# Patient Record
Sex: MALE | Race: WHITE | NOT HISPANIC OR LATINO | Employment: OTHER | URBAN - NONMETROPOLITAN AREA
[De-identification: names, ages, dates, MRNs, and addresses within clinical notes are randomized per-mention and may not be internally consistent; named-entity substitution may affect disease eponyms.]

---

## 2024-06-08 ENCOUNTER — HOSPITAL ENCOUNTER (INPATIENT)
Facility: HOSPITAL | Age: 59
LOS: 2 days | Discharge: HOME | End: 2024-06-11
Attending: EMERGENCY MEDICINE | Admitting: STUDENT IN AN ORGANIZED HEALTH CARE EDUCATION/TRAINING PROGRAM

## 2024-06-08 DIAGNOSIS — N28.9 KIDNEY LESION, NATIVE, RIGHT: ICD-10-CM

## 2024-06-08 DIAGNOSIS — E11.65 UNCONTROLLED TYPE 2 DIABETES MELLITUS WITH HYPERGLYCEMIA (MULTI): ICD-10-CM

## 2024-06-08 DIAGNOSIS — R79.89 LACTATE BLOOD INCREASE: ICD-10-CM

## 2024-06-08 DIAGNOSIS — N39.0 UTI (URINARY TRACT INFECTION): Primary | ICD-10-CM

## 2024-06-08 DIAGNOSIS — N41.0 ACUTE PROSTATITIS: ICD-10-CM

## 2024-06-08 LAB
ANION GAP SERPL CALC-SCNC: 14 MMOL/L (ref 10–20)
APPEARANCE UR: ABNORMAL
BACTERIA #/AREA URNS AUTO: ABNORMAL /HPF
BASOPHILS # BLD AUTO: 0.04 X10*3/UL (ref 0–0.1)
BASOPHILS NFR BLD AUTO: 0.3 %
BILIRUB UR STRIP.AUTO-MCNC: NEGATIVE MG/DL
BUN SERPL-MCNC: 12 MG/DL (ref 6–23)
CALCIUM SERPL-MCNC: 9.2 MG/DL (ref 8.6–10.3)
CHLORIDE SERPL-SCNC: 99 MMOL/L (ref 98–107)
CO2 SERPL-SCNC: 24 MMOL/L (ref 21–32)
COLOR UR: YELLOW
CREAT SERPL-MCNC: 0.95 MG/DL (ref 0.5–1.3)
EGFRCR SERPLBLD CKD-EPI 2021: >90 ML/MIN/1.73M*2
EOSINOPHIL # BLD AUTO: 0.02 X10*3/UL (ref 0–0.7)
EOSINOPHIL NFR BLD AUTO: 0.2 %
ERYTHROCYTE [DISTWIDTH] IN BLOOD BY AUTOMATED COUNT: 12.5 % (ref 11.5–14.5)
GLUCOSE SERPL-MCNC: 220 MG/DL (ref 74–99)
GLUCOSE UR STRIP.AUTO-MCNC: ABNORMAL MG/DL
HCT VFR BLD AUTO: 43 % (ref 41–52)
HGB BLD-MCNC: 14.7 G/DL (ref 13.5–17.5)
IMM GRANULOCYTES # BLD AUTO: 0.06 X10*3/UL (ref 0–0.7)
IMM GRANULOCYTES NFR BLD AUTO: 0.5 % (ref 0–0.9)
KETONES UR STRIP.AUTO-MCNC: ABNORMAL MG/DL
LACTATE SERPL-SCNC: 2.9 MMOL/L (ref 0.4–2)
LEUKOCYTE ESTERASE UR QL STRIP.AUTO: ABNORMAL
LYMPHOCYTES # BLD AUTO: 0.76 X10*3/UL (ref 1.2–4.8)
LYMPHOCYTES NFR BLD AUTO: 6 %
MCH RBC QN AUTO: 32.1 PG (ref 26–34)
MCHC RBC AUTO-ENTMCNC: 34.2 G/DL (ref 32–36)
MCV RBC AUTO: 94 FL (ref 80–100)
MONOCYTES # BLD AUTO: 0.55 X10*3/UL (ref 0.1–1)
MONOCYTES NFR BLD AUTO: 4.3 %
NEUTROPHILS # BLD AUTO: 11.22 X10*3/UL (ref 1.2–7.7)
NEUTROPHILS NFR BLD AUTO: 88.7 %
NITRITE UR QL STRIP.AUTO: NEGATIVE
NRBC BLD-RTO: 0 /100 WBCS (ref 0–0)
PH UR STRIP.AUTO: 5 [PH]
PLATELET # BLD AUTO: 118 X10*3/UL (ref 150–450)
POTASSIUM SERPL-SCNC: 3.8 MMOL/L (ref 3.5–5.3)
PROT UR STRIP.AUTO-MCNC: ABNORMAL MG/DL
RBC # BLD AUTO: 4.58 X10*6/UL (ref 4.5–5.9)
RBC # UR STRIP.AUTO: ABNORMAL /UL
RBC #/AREA URNS AUTO: >20 /HPF
RBC MORPH BLD: NORMAL
SODIUM SERPL-SCNC: 133 MMOL/L (ref 136–145)
SP GR UR STRIP.AUTO: 1.02
STOMATOCYTES BLD QL SMEAR: NORMAL
UROBILINOGEN UR STRIP.AUTO-MCNC: <2 MG/DL
WBC # BLD AUTO: 12.7 X10*3/UL (ref 4.4–11.3)
WBC #/AREA URNS AUTO: >50 /HPF

## 2024-06-08 PROCEDURE — 85025 COMPLETE CBC W/AUTO DIFF WBC: CPT | Performed by: EMERGENCY MEDICINE

## 2024-06-08 PROCEDURE — 87491 CHLMYD TRACH DNA AMP PROBE: CPT | Mod: CONLAB | Performed by: EMERGENCY MEDICINE

## 2024-06-08 PROCEDURE — 51798 US URINE CAPACITY MEASURE: CPT

## 2024-06-08 PROCEDURE — 83605 ASSAY OF LACTIC ACID: CPT | Performed by: EMERGENCY MEDICINE

## 2024-06-08 PROCEDURE — 87040 BLOOD CULTURE FOR BACTERIA: CPT | Mod: CONLAB | Performed by: EMERGENCY MEDICINE

## 2024-06-08 PROCEDURE — 87205 SMEAR GRAM STAIN: CPT | Mod: CONLAB | Performed by: EMERGENCY MEDICINE

## 2024-06-08 PROCEDURE — 81001 URINALYSIS AUTO W/SCOPE: CPT | Performed by: EMERGENCY MEDICINE

## 2024-06-08 PROCEDURE — 2500000004 HC RX 250 GENERAL PHARMACY W/ HCPCS (ALT 636 FOR OP/ED): Performed by: EMERGENCY MEDICINE

## 2024-06-08 PROCEDURE — 80048 BASIC METABOLIC PNL TOTAL CA: CPT | Performed by: EMERGENCY MEDICINE

## 2024-06-08 PROCEDURE — 36415 COLL VENOUS BLD VENIPUNCTURE: CPT | Performed by: EMERGENCY MEDICINE

## 2024-06-08 PROCEDURE — 99285 EMERGENCY DEPT VISIT HI MDM: CPT

## 2024-06-08 PROCEDURE — 2500000001 HC RX 250 WO HCPCS SELF ADMINISTERED DRUGS (ALT 637 FOR MEDICARE OP): Performed by: EMERGENCY MEDICINE

## 2024-06-08 RX ORDER — PHENAZOPYRIDINE HYDROCHLORIDE 100 MG/1
95 TABLET, FILM COATED ORAL ONCE
Status: COMPLETED | OUTPATIENT
Start: 2024-06-08 | End: 2024-06-08

## 2024-06-08 RX ORDER — GLYBURIDE 5 MG/1
5 TABLET ORAL 2 TIMES DAILY PRN
COMMUNITY
Start: 2024-04-11

## 2024-06-08 RX ORDER — CEFTRIAXONE 1 G/50ML
1 INJECTION, SOLUTION INTRAVENOUS ONCE
Status: COMPLETED | OUTPATIENT
Start: 2024-06-08 | End: 2024-06-08

## 2024-06-08 RX ORDER — METOPROLOL SUCCINATE 50 MG/1
50 TABLET, EXTENDED RELEASE ORAL DAILY
COMMUNITY
Start: 2024-04-11 | End: 2024-06-15 | Stop reason: SDUPTHER

## 2024-06-08 RX ORDER — LISINOPRIL 2.5 MG/1
2.5 TABLET ORAL DAILY
COMMUNITY
Start: 2024-04-11 | End: 2024-06-15 | Stop reason: SDUPTHER

## 2024-06-08 RX ORDER — ACETAMINOPHEN 325 MG/1
975 TABLET ORAL ONCE
Status: COMPLETED | OUTPATIENT
Start: 2024-06-08 | End: 2024-06-08

## 2024-06-08 RX ORDER — KETOROLAC TROMETHAMINE 15 MG/ML
15 INJECTION, SOLUTION INTRAMUSCULAR; INTRAVENOUS ONCE
Status: COMPLETED | OUTPATIENT
Start: 2024-06-08 | End: 2024-06-08

## 2024-06-08 RX ADMIN — ACETAMINOPHEN 975 MG: 325 TABLET ORAL at 22:43

## 2024-06-08 RX ADMIN — KETOROLAC TROMETHAMINE 15 MG: 15 INJECTION, SOLUTION INTRAMUSCULAR; INTRAVENOUS at 22:44

## 2024-06-08 RX ADMIN — SODIUM CHLORIDE 1000 ML: 9 INJECTION, SOLUTION INTRAVENOUS at 23:56

## 2024-06-08 RX ADMIN — CEFTRIAXONE 1 G: 1 INJECTION, SOLUTION INTRAVENOUS at 23:12

## 2024-06-08 RX ADMIN — PHENAZOPYRIDINE 100 MG: 100 TABLET ORAL at 22:43

## 2024-06-08 RX ADMIN — SODIUM CHLORIDE 500 ML: 9 INJECTION, SOLUTION INTRAVENOUS at 22:43

## 2024-06-08 ASSESSMENT — PAIN DESCRIPTION - PAIN TYPE: TYPE: ACUTE PAIN

## 2024-06-08 ASSESSMENT — COLUMBIA-SUICIDE SEVERITY RATING SCALE - C-SSRS
2. HAVE YOU ACTUALLY HAD ANY THOUGHTS OF KILLING YOURSELF?: NO
6. HAVE YOU EVER DONE ANYTHING, STARTED TO DO ANYTHING, OR PREPARED TO DO ANYTHING TO END YOUR LIFE?: NO
1. IN THE PAST MONTH, HAVE YOU WISHED YOU WERE DEAD OR WISHED YOU COULD GO TO SLEEP AND NOT WAKE UP?: NO

## 2024-06-08 ASSESSMENT — PAIN SCALES - GENERAL: PAINLEVEL_OUTOF10: 8

## 2024-06-08 ASSESSMENT — PAIN DESCRIPTION - LOCATION: LOCATION: GROIN

## 2024-06-08 ASSESSMENT — PAIN - FUNCTIONAL ASSESSMENT: PAIN_FUNCTIONAL_ASSESSMENT: 0-10

## 2024-06-09 ENCOUNTER — APPOINTMENT (OUTPATIENT)
Dept: CARDIOLOGY | Facility: HOSPITAL | Age: 59
End: 2024-06-09

## 2024-06-09 ENCOUNTER — APPOINTMENT (OUTPATIENT)
Dept: RADIOLOGY | Facility: HOSPITAL | Age: 59
End: 2024-06-09

## 2024-06-09 PROBLEM — N41.0 ACUTE PROSTATITIS: Status: ACTIVE | Noted: 2024-06-09

## 2024-06-09 PROBLEM — N28.9 KIDNEY LESION, NATIVE, RIGHT: Status: ACTIVE | Noted: 2024-06-09

## 2024-06-09 PROBLEM — R79.89 LACTATE BLOOD INCREASE: Status: ACTIVE | Noted: 2024-06-09

## 2024-06-09 PROBLEM — N39.0 UTI (URINARY TRACT INFECTION): Status: ACTIVE | Noted: 2024-06-09

## 2024-06-09 LAB
ANION GAP SERPL CALC-SCNC: 13 MMOL/L (ref 10–20)
BUN SERPL-MCNC: 12 MG/DL (ref 6–23)
CALCIUM SERPL-MCNC: 8.3 MG/DL (ref 8.6–10.3)
CHLORIDE SERPL-SCNC: 101 MMOL/L (ref 98–107)
CO2 SERPL-SCNC: 24 MMOL/L (ref 21–32)
CREAT SERPL-MCNC: 0.99 MG/DL (ref 0.5–1.3)
EGFRCR SERPLBLD CKD-EPI 2021: 88 ML/MIN/1.73M*2
ERYTHROCYTE [DISTWIDTH] IN BLOOD BY AUTOMATED COUNT: 13 % (ref 11.5–14.5)
EST. AVERAGE GLUCOSE BLD GHB EST-MCNC: 203 MG/DL
GLUCOSE BLD MANUAL STRIP-MCNC: 222 MG/DL (ref 74–99)
GLUCOSE BLD MANUAL STRIP-MCNC: 228 MG/DL (ref 74–99)
GLUCOSE SERPL-MCNC: 228 MG/DL (ref 74–99)
HBA1C MFR BLD: 8.7 %
HCT VFR BLD AUTO: 40.1 % (ref 41–52)
HGB BLD-MCNC: 13.2 G/DL (ref 13.5–17.5)
HOLD SPECIMEN: NORMAL
LACTATE SERPL-SCNC: 2.8 MMOL/L (ref 0.4–2)
LACTATE SERPL-SCNC: 3.1 MMOL/L (ref 0.4–2)
LACTATE SERPL-SCNC: 3.4 MMOL/L (ref 0.4–2)
MCH RBC QN AUTO: 31.5 PG (ref 26–34)
MCHC RBC AUTO-ENTMCNC: 32.9 G/DL (ref 32–36)
MCV RBC AUTO: 96 FL (ref 80–100)
NRBC BLD-RTO: 0 /100 WBCS (ref 0–0)
PLATELET # BLD AUTO: 114 X10*3/UL (ref 150–450)
POTASSIUM SERPL-SCNC: 4.1 MMOL/L (ref 3.5–5.3)
RBC # BLD AUTO: 4.19 X10*6/UL (ref 4.5–5.9)
SODIUM SERPL-SCNC: 134 MMOL/L (ref 136–145)
WBC # BLD AUTO: 15.5 X10*3/UL (ref 4.4–11.3)

## 2024-06-09 PROCEDURE — 36415 COLL VENOUS BLD VENIPUNCTURE: CPT | Performed by: EMERGENCY MEDICINE

## 2024-06-09 PROCEDURE — 83605 ASSAY OF LACTIC ACID: CPT | Mod: IPSPLIT | Performed by: EMERGENCY MEDICINE

## 2024-06-09 PROCEDURE — 83605 ASSAY OF LACTIC ACID: CPT | Performed by: EMERGENCY MEDICINE

## 2024-06-09 PROCEDURE — 2500000004 HC RX 250 GENERAL PHARMACY W/ HCPCS (ALT 636 FOR OP/ED): Performed by: EMERGENCY MEDICINE

## 2024-06-09 PROCEDURE — 74177 CT ABD & PELVIS W/CONTRAST: CPT | Performed by: RADIOLOGY

## 2024-06-09 PROCEDURE — 2500000001 HC RX 250 WO HCPCS SELF ADMINISTERED DRUGS (ALT 637 FOR MEDICARE OP): Mod: IPSPLIT | Performed by: INTERNAL MEDICINE

## 2024-06-09 PROCEDURE — 85027 COMPLETE CBC AUTOMATED: CPT | Mod: IPSPLIT | Performed by: NURSE PRACTITIONER

## 2024-06-09 PROCEDURE — 2500000004 HC RX 250 GENERAL PHARMACY W/ HCPCS (ALT 636 FOR OP/ED): Mod: IPSPLIT

## 2024-06-09 PROCEDURE — 74177 CT ABD & PELVIS W/CONTRAST: CPT

## 2024-06-09 PROCEDURE — 2500000001 HC RX 250 WO HCPCS SELF ADMINISTERED DRUGS (ALT 637 FOR MEDICARE OP): Mod: IPSPLIT | Performed by: NURSE PRACTITIONER

## 2024-06-09 PROCEDURE — 94760 N-INVAS EAR/PLS OXIMETRY 1: CPT | Mod: IPSPLIT

## 2024-06-09 PROCEDURE — 93010 ELECTROCARDIOGRAM REPORT: CPT | Performed by: INTERNAL MEDICINE

## 2024-06-09 PROCEDURE — 93005 ELECTROCARDIOGRAM TRACING: CPT | Mod: IPSPLIT

## 2024-06-09 PROCEDURE — 82947 ASSAY GLUCOSE BLOOD QUANT: CPT | Mod: IPSPLIT

## 2024-06-09 PROCEDURE — 2500000004 HC RX 250 GENERAL PHARMACY W/ HCPCS (ALT 636 FOR OP/ED): Mod: IPSPLIT | Performed by: NURSE PRACTITIONER

## 2024-06-09 PROCEDURE — 1210000001 HC SEMI-PRIVATE ROOM DAILY: Mod: IPSPLIT

## 2024-06-09 PROCEDURE — 80048 BASIC METABOLIC PNL TOTAL CA: CPT | Mod: IPSPLIT | Performed by: NURSE PRACTITIONER

## 2024-06-09 PROCEDURE — 83036 HEMOGLOBIN GLYCOSYLATED A1C: CPT | Mod: CONLAB | Performed by: NURSE PRACTITIONER

## 2024-06-09 PROCEDURE — 2550000001 HC RX 255 CONTRASTS: Performed by: EMERGENCY MEDICINE

## 2024-06-09 RX ORDER — ONDANSETRON 4 MG/1
4 TABLET, FILM COATED ORAL EVERY 8 HOURS PRN
Status: DISCONTINUED | OUTPATIENT
Start: 2024-06-09 | End: 2024-06-11 | Stop reason: HOSPADM

## 2024-06-09 RX ORDER — ACETAMINOPHEN 160 MG/5ML
650 SOLUTION ORAL EVERY 4 HOURS PRN
Status: DISCONTINUED | OUTPATIENT
Start: 2024-06-09 | End: 2024-06-10

## 2024-06-09 RX ORDER — FAMOTIDINE 20 MG/1
20 TABLET, FILM COATED ORAL 2 TIMES DAILY
Status: DISCONTINUED | OUTPATIENT
Start: 2024-06-09 | End: 2024-06-11 | Stop reason: HOSPADM

## 2024-06-09 RX ORDER — ENOXAPARIN SODIUM 100 MG/ML
40 INJECTION SUBCUTANEOUS EVERY 24 HOURS
Status: DISCONTINUED | OUTPATIENT
Start: 2024-06-10 | End: 2024-06-11 | Stop reason: HOSPADM

## 2024-06-09 RX ORDER — SODIUM CHLORIDE 9 MG/ML
INJECTION, SOLUTION INTRAVENOUS
Status: COMPLETED
Start: 2024-06-09 | End: 2024-06-09

## 2024-06-09 RX ORDER — LISINOPRIL 2.5 MG/1
2.5 TABLET ORAL ONCE
Status: COMPLETED | OUTPATIENT
Start: 2024-06-09 | End: 2024-06-09

## 2024-06-09 RX ORDER — SODIUM CHLORIDE, SODIUM LACTATE, POTASSIUM CHLORIDE, CALCIUM CHLORIDE 600; 310; 30; 20 MG/100ML; MG/100ML; MG/100ML; MG/100ML
INJECTION, SOLUTION INTRAVENOUS
Status: COMPLETED
Start: 2024-06-09 | End: 2024-06-09

## 2024-06-09 RX ORDER — TALC
3 POWDER (GRAM) TOPICAL NIGHTLY PRN
Status: DISCONTINUED | OUTPATIENT
Start: 2024-06-09 | End: 2024-06-11 | Stop reason: HOSPADM

## 2024-06-09 RX ORDER — CEFTRIAXONE 1 G/50ML
1 INJECTION, SOLUTION INTRAVENOUS ONCE
Status: DISCONTINUED | OUTPATIENT
Start: 2024-06-09 | End: 2024-06-09

## 2024-06-09 RX ORDER — GLIPIZIDE 5 MG/1
2.5 TABLET ORAL
Status: DISCONTINUED | OUTPATIENT
Start: 2024-06-10 | End: 2024-06-11 | Stop reason: HOSPADM

## 2024-06-09 RX ORDER — SODIUM CHLORIDE 9 MG/ML
100 INJECTION, SOLUTION INTRAVENOUS CONTINUOUS
Status: DISCONTINUED | OUTPATIENT
Start: 2024-06-09 | End: 2024-06-11 | Stop reason: HOSPADM

## 2024-06-09 RX ORDER — METOPROLOL SUCCINATE 50 MG/1
50 TABLET, EXTENDED RELEASE ORAL ONCE
Status: COMPLETED | OUTPATIENT
Start: 2024-06-09 | End: 2024-06-09

## 2024-06-09 RX ORDER — CEFTRIAXONE 1 G/50ML
1 INJECTION, SOLUTION INTRAVENOUS EVERY 24 HOURS
Status: DISCONTINUED | OUTPATIENT
Start: 2024-06-09 | End: 2024-06-10

## 2024-06-09 RX ORDER — ONDANSETRON HYDROCHLORIDE 2 MG/ML
4 INJECTION, SOLUTION INTRAVENOUS EVERY 8 HOURS PRN
Status: DISCONTINUED | OUTPATIENT
Start: 2024-06-09 | End: 2024-06-10

## 2024-06-09 RX ORDER — ACETAMINOPHEN 650 MG/1
650 SUPPOSITORY RECTAL EVERY 4 HOURS PRN
Status: DISCONTINUED | OUTPATIENT
Start: 2024-06-09 | End: 2024-06-10

## 2024-06-09 RX ORDER — FAMOTIDINE 10 MG/ML
20 INJECTION INTRAVENOUS 2 TIMES DAILY
Status: DISCONTINUED | OUTPATIENT
Start: 2024-06-09 | End: 2024-06-10

## 2024-06-09 RX ORDER — LISINOPRIL 2.5 MG/1
2.5 TABLET ORAL DAILY
Status: DISCONTINUED | OUTPATIENT
Start: 2024-06-09 | End: 2024-06-11 | Stop reason: HOSPADM

## 2024-06-09 RX ORDER — ENOXAPARIN SODIUM 100 MG/ML
40 INJECTION SUBCUTANEOUS EVERY 24 HOURS
Status: DISCONTINUED | OUTPATIENT
Start: 2024-06-09 | End: 2024-06-09

## 2024-06-09 RX ORDER — METOPROLOL SUCCINATE 50 MG/1
50 TABLET, EXTENDED RELEASE ORAL DAILY
Status: DISCONTINUED | OUTPATIENT
Start: 2024-06-09 | End: 2024-06-11 | Stop reason: HOSPADM

## 2024-06-09 RX ORDER — ACETAMINOPHEN 325 MG/1
650 TABLET ORAL EVERY 4 HOURS PRN
Status: DISCONTINUED | OUTPATIENT
Start: 2024-06-09 | End: 2024-06-11

## 2024-06-09 RX ADMIN — SODIUM CHLORIDE 100 ML/HR: 9 INJECTION, SOLUTION INTRAVENOUS at 06:55

## 2024-06-09 RX ADMIN — ACETAMINOPHEN 650 MG: 325 TABLET ORAL at 21:08

## 2024-06-09 RX ADMIN — METOPROLOL SUCCINATE 50 MG: 50 TABLET, EXTENDED RELEASE ORAL at 21:07

## 2024-06-09 RX ADMIN — IOHEXOL 75 ML: 350 INJECTION, SOLUTION INTRAVENOUS at 03:40

## 2024-06-09 RX ADMIN — CEFTRIAXONE 1 G: 1 INJECTION, SOLUTION INTRAVENOUS at 22:58

## 2024-06-09 RX ADMIN — SODIUM CHLORIDE 100 ML/HR: 0.9 INJECTION, SOLUTION INTRAVENOUS at 21:15

## 2024-06-09 RX ADMIN — SODIUM CHLORIDE, POTASSIUM CHLORIDE, SODIUM LACTATE AND CALCIUM CHLORIDE 1000 ML: 600; 310; 30; 20 INJECTION, SOLUTION INTRAVENOUS at 09:54

## 2024-06-09 RX ADMIN — SODIUM CHLORIDE 1000 ML: 9 INJECTION, SOLUTION INTRAVENOUS at 02:34

## 2024-06-09 RX ADMIN — SODIUM CHLORIDE 100 ML/HR: 9 INJECTION, SOLUTION INTRAVENOUS at 16:39

## 2024-06-09 RX ADMIN — FAMOTIDINE 20 MG: 20 TABLET ORAL at 21:05

## 2024-06-09 RX ADMIN — LISINOPRIL 2.5 MG: 2.5 TABLET ORAL at 21:07

## 2024-06-09 RX ADMIN — FAMOTIDINE 20 MG: 20 TABLET ORAL at 08:47

## 2024-06-09 RX ADMIN — ACETAMINOPHEN 650 MG: 325 TABLET ORAL at 06:53

## 2024-06-09 RX ADMIN — SODIUM CHLORIDE 100 ML/HR: 9 INJECTION, SOLUTION INTRAVENOUS at 21:15

## 2024-06-09 SDOH — SOCIAL STABILITY: SOCIAL INSECURITY: ABUSE: ADULT

## 2024-06-09 SDOH — SOCIAL STABILITY: SOCIAL INSECURITY: WERE YOU ABLE TO COMPLETE ALL THE BEHAVIORAL HEALTH SCREENINGS?: YES

## 2024-06-09 SDOH — SOCIAL STABILITY: SOCIAL INSECURITY: DO YOU FEEL UNSAFE GOING BACK TO THE PLACE WHERE YOU ARE LIVING?: NO

## 2024-06-09 SDOH — SOCIAL STABILITY: SOCIAL INSECURITY: DO YOU FEEL ANYONE HAS EXPLOITED OR TAKEN ADVANTAGE OF YOU FINANCIALLY OR OF YOUR PERSONAL PROPERTY?: NO

## 2024-06-09 SDOH — SOCIAL STABILITY: SOCIAL INSECURITY: HAVE YOU HAD ANY THOUGHTS OF HARMING ANYONE ELSE?: NO

## 2024-06-09 SDOH — SOCIAL STABILITY: SOCIAL INSECURITY: HAVE YOU HAD THOUGHTS OF HARMING ANYONE ELSE?: NO

## 2024-06-09 SDOH — SOCIAL STABILITY: SOCIAL INSECURITY: ARE THERE ANY APPARENT SIGNS OF INJURIES/BEHAVIORS THAT COULD BE RELATED TO ABUSE/NEGLECT?: NO

## 2024-06-09 SDOH — SOCIAL STABILITY: SOCIAL INSECURITY: HAS ANYONE EVER THREATENED TO HURT YOUR FAMILY OR YOUR PETS?: NO

## 2024-06-09 SDOH — SOCIAL STABILITY: SOCIAL INSECURITY: DOES ANYONE TRY TO KEEP YOU FROM HAVING/CONTACTING OTHER FRIENDS OR DOING THINGS OUTSIDE YOUR HOME?: NO

## 2024-06-09 SDOH — SOCIAL STABILITY: SOCIAL INSECURITY: ARE YOU OR HAVE YOU BEEN THREATENED OR ABUSED PHYSICALLY, EMOTIONALLY, OR SEXUALLY BY ANYONE?: NO

## 2024-06-09 ASSESSMENT — ENCOUNTER SYMPTOMS
FLANK PAIN: 1
ENDOCRINE NEGATIVE: 1
ABDOMINAL PAIN: 0
DIAPHORESIS: 1
ABDOMINAL DISTENTION: 1
ALLERGIC/IMMUNOLOGIC NEGATIVE: 1
FATIGUE: 1
HEMATOLOGIC/LYMPHATIC NEGATIVE: 1
HEMATURIA: 1
RESPIRATORY NEGATIVE: 1
ACTIVITY CHANGE: 1
APPETITE CHANGE: 1
UNEXPECTED WEIGHT CHANGE: 0
PSYCHIATRIC NEGATIVE: 1
CHILLS: 1
DYSURIA: 1
EYES NEGATIVE: 1
BACK PAIN: 1
NEUROLOGICAL NEGATIVE: 1
FREQUENCY: 1
ARTHRALGIAS: 1
FEVER: 1
CARDIOVASCULAR NEGATIVE: 1
DIFFICULTY URINATING: 1

## 2024-06-09 ASSESSMENT — PATIENT HEALTH QUESTIONNAIRE - PHQ9
SUM OF ALL RESPONSES TO PHQ9 QUESTIONS 1 & 2: 0
2. FEELING DOWN, DEPRESSED OR HOPELESS: NOT AT ALL
1. LITTLE INTEREST OR PLEASURE IN DOING THINGS: NOT AT ALL

## 2024-06-09 ASSESSMENT — ACTIVITIES OF DAILY LIVING (ADL)
HEARING - LEFT EAR: FUNCTIONAL
GROOMING: INDEPENDENT
JUDGMENT_ADEQUATE_SAFELY_COMPLETE_DAILY_ACTIVITIES: YES
TOILETING: INDEPENDENT
HEARING - RIGHT EAR: FUNCTIONAL
ADEQUATE_TO_COMPLETE_ADL: YES
DRESSING YOURSELF: INDEPENDENT
LACK_OF_TRANSPORTATION: NO
WALKS IN HOME: INDEPENDENT
PATIENT'S MEMORY ADEQUATE TO SAFELY COMPLETE DAILY ACTIVITIES?: YES
FEEDING YOURSELF: INDEPENDENT
BATHING: INDEPENDENT

## 2024-06-09 ASSESSMENT — LIFESTYLE VARIABLES
AUDIT-C TOTAL SCORE: 0
HOW OFTEN DO YOU HAVE A DRINK CONTAINING ALCOHOL: NEVER
HOW OFTEN DO YOU HAVE 6 OR MORE DRINKS ON ONE OCCASION: NEVER
SKIP TO QUESTIONS 9-10: 1
AUDIT-C TOTAL SCORE: 0
HOW MANY STANDARD DRINKS CONTAINING ALCOHOL DO YOU HAVE ON A TYPICAL DAY: PATIENT DOES NOT DRINK

## 2024-06-09 ASSESSMENT — COGNITIVE AND FUNCTIONAL STATUS - GENERAL
PATIENT BASELINE BEDBOUND: NO
CLIMB 3 TO 5 STEPS WITH RAILING: A LITTLE
DAILY ACTIVITIY SCORE: 24
MOBILITY SCORE: 23
DAILY ACTIVITIY SCORE: 24
CLIMB 3 TO 5 STEPS WITH RAILING: A LITTLE
MOBILITY SCORE: 23

## 2024-06-09 ASSESSMENT — PAIN SCALES - GENERAL
PAINLEVEL_OUTOF10: 2
PAINLEVEL_OUTOF10: 3
PAINLEVEL_OUTOF10: 3
PAINLEVEL_OUTOF10: 6
PAINLEVEL_OUTOF10: 1
PAINLEVEL_OUTOF10: 0 - NO PAIN

## 2024-06-09 ASSESSMENT — PAIN - FUNCTIONAL ASSESSMENT
PAIN_FUNCTIONAL_ASSESSMENT: 0-10

## 2024-06-09 NOTE — CARE PLAN
Patient started the shift with an elevated HR but it came down after his bolus. He tolerated fluids well throughout the shift. No complaints of pain this shift.

## 2024-06-09 NOTE — H&P
History Of Present Illness  Cam Baird is a 58 y.o. male presenting with  complaints of dysuria, urgency, frequency and feeling that he is incompletely emptying his bladder.  Patient said it [place] this [place] [employer].  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  He tried to force fluids and a lot of cranberry juice today but it does seem like he got worse.  He says the end of his penis burns whenever he voids.  He has began having some pelvic pressure as well.  No scrotal pain.  No nausea or vomiting.  No back or flank pain.  Recent cough, congestion, URI symptoms.  No known history of prostate issues.  Denies possibility for sexually transmitted infection.     Patient's primary doctor is Tutu Perkins.  He sees him for hypertension and non-insulin-dependent diabetes.     Past Medical History  Past Medical History:   Diagnosis Date    Dysmetabolic syndrome X     Hypertension        Surgical History  History reviewed. No pertinent surgical history.     Social History  He reports that he has never smoked. He has never used smokeless tobacco. No history on file for alcohol use and drug use.    Family History  No family history on file.     Allergies  Doxycycline hcl and Guaifenesin    Review of Systems   Constitutional:  Positive for activity change, appetite change, chills, diaphoresis, fatigue and fever. Negative for unexpected weight change.   HENT:  Negative for congestion and dental problem.    Eyes: Negative.    Respiratory: Negative.     Cardiovascular: Negative.    Gastrointestinal:  Positive for abdominal distention. Negative for abdominal pain.   Endocrine: Negative.    Genitourinary:  Positive for decreased urine volume, difficulty urinating, dysuria, enuresis, flank pain, frequency, hematuria and urgency. Negative for genital sores, penile discharge, penile pain, penile swelling, scrotal  swelling and testicular pain.   Musculoskeletal:  Positive for arthralgias and back pain.   Allergic/Immunologic: Negative.    Neurological: Negative.    Hematological: Negative.    Psychiatric/Behavioral: Negative.          Physical Exam  Vitals reviewed. Exam conducted with a chaperone present (RAMYA Hammonds RN).   Constitutional:       Appearance: He is obese.   HENT:      Head: Normocephalic and atraumatic.      Right Ear: Tympanic membrane normal.      Left Ear: Tympanic membrane normal.      Nose: Nose normal.      Mouth/Throat:      Mouth: Mucous membranes are moist.   Eyes:      Extraocular Movements: Extraocular movements intact.      Pupils: Pupils are equal, round, and reactive to light.   Cardiovascular:      Rate and Rhythm: Regular rhythm. Tachycardia present.      Pulses: Normal pulses.      Heart sounds: Normal heart sounds.   Pulmonary:      Effort: Pulmonary effort is normal.      Breath sounds: Normal breath sounds.   Abdominal:      General: Bowel sounds are normal.      Palpations: Abdomen is soft.      Tenderness: There is no abdominal tenderness. There is no guarding or rebound.   Genitourinary:     Testes: Normal.      Comments: Penis is circumcised, retracted, no urethral discharge.  No rash or lesion.  No inguinal hernia or adenopathy.  No scrotal tenderness.  Bilateral testicles descended, intact cremasterics.  Musculoskeletal:         General: Normal range of motion.      Cervical back: Normal range of motion and neck supple.   Skin:     General: Skin is warm and dry.      Capillary Refill: Capillary refill takes less than 2 seconds.      Coloration: Skin is not jaundiced.   Neurological:      General: No focal deficit present.      Mental Status: He is alert and oriented to person, place, and time.   Psychiatric:         Mood and Affect: Mood normal.      Last Recorded Vitals  Blood pressure 112/75, pulse (!) 138, temperature 37.5 °C (99.5 °F), temperature source Temporal, resp. rate 20,  "height 1.875 m (6' 1.82\"), weight (!) 162 kg (357 lb 5.9 oz), SpO2 96%.    Relevant Results  Scheduled medications  cefTRIAXone, 1 g, intravenous, q24h  [START ON 6/10/2024] enoxaparin, 40 mg, subcutaneous, q24h  famotidine, 20 mg, oral, BID   Or  famotidine, 20 mg, intravenous, BID  lactated Ringer's, 1,000 mL, intravenous, Once      Continuous medications  sodium chloride 0.9%, 100 mL/hr, Last Rate: 100 mL/hr (06/09/24 0943)      PRN medications  PRN medications: acetaminophen **OR** acetaminophen **OR** acetaminophen, melatonin, ondansetron **OR** ondansetron       Results for orders placed or performed during the hospital encounter of 06/08/24 (from the past 96 hour(s))   Urinalysis with Reflex Culture and Microscopic   Result Value Ref Range    Color, Urine Yellow Straw, Yellow    Appearance, Urine Hazy (N) Clear    Specific Gravity, Urine 1.018 1.005 - 1.035    pH, Urine 5.0 5.0, 5.5, 6.0, 6.5, 7.0, 7.5, 8.0    Protein, Urine 30 (1+) (N) NEGATIVE mg/dL    Glucose, Urine 50 (1+) (A) NEGATIVE mg/dL    Blood, Urine LARGE (3+) (A) NEGATIVE    Ketones, Urine 5 (TRACE) (A) NEGATIVE mg/dL    Bilirubin, Urine NEGATIVE NEGATIVE    Urobilinogen, Urine <2.0 <2.0 mg/dL    Nitrite, Urine NEGATIVE NEGATIVE    Leukocyte Esterase, Urine LARGE (3+) (A) NEGATIVE   Extra Urine Gray Tube   Result Value Ref Range    Extra Tube Hold for add-ons.    Microscopic Only, Urine   Result Value Ref Range    WBC, Urine >50 (A) 1-5, NONE /HPF    RBC, Urine >20 (A) NONE, 1-2, 3-5 /HPF    Bacteria, Urine 1+ (A) NONE SEEN /HPF   CBC and Auto Differential   Result Value Ref Range    WBC 12.7 (H) 4.4 - 11.3 x10*3/uL    nRBC 0.0 0.0 - 0.0 /100 WBCs    RBC 4.58 4.50 - 5.90 x10*6/uL    Hemoglobin 14.7 13.5 - 17.5 g/dL    Hematocrit 43.0 41.0 - 52.0 %    MCV 94 80 - 100 fL    MCH 32.1 26.0 - 34.0 pg    MCHC 34.2 32.0 - 36.0 g/dL    RDW 12.5 11.5 - 14.5 %    Platelets 118 (L) 150 - 450 x10*3/uL    Neutrophils % 88.7 40.0 - 80.0 %    Immature " Granulocytes %, Automated 0.5 0.0 - 0.9 %    Lymphocytes % 6.0 13.0 - 44.0 %    Monocytes % 4.3 2.0 - 10.0 %    Eosinophils % 0.2 0.0 - 6.0 %    Basophils % 0.3 0.0 - 2.0 %    Neutrophils Absolute 11.22 (H) 1.20 - 7.70 x10*3/uL    Immature Granulocytes Absolute, Automated 0.06 0.00 - 0.70 x10*3/uL    Lymphocytes Absolute 0.76 (L) 1.20 - 4.80 x10*3/uL    Monocytes Absolute 0.55 0.10 - 1.00 x10*3/uL    Eosinophils Absolute 0.02 0.00 - 0.70 x10*3/uL    Basophils Absolute 0.04 0.00 - 0.10 x10*3/uL   Basic metabolic panel   Result Value Ref Range    Glucose 220 (H) 74 - 99 mg/dL    Sodium 133 (L) 136 - 145 mmol/L    Potassium 3.8 3.5 - 5.3 mmol/L    Chloride 99 98 - 107 mmol/L    Bicarbonate 24 21 - 32 mmol/L    Anion Gap 14 10 - 20 mmol/L    Urea Nitrogen 12 6 - 23 mg/dL    Creatinine 0.95 0.50 - 1.30 mg/dL    eGFR >90 >60 mL/min/1.73m*2    Calcium 9.2 8.6 - 10.3 mg/dL   Light Blue Top   Result Value Ref Range    Extra Tube Hold for add-ons.    Red Top   Result Value Ref Range    Extra Tube Hold for add-ons.    Gray Top   Result Value Ref Range    Extra Tube Hold for add-ons.    Morphology   Result Value Ref Range    RBC Morphology See Below     Stomatocytes Few    Lactate   Result Value Ref Range    Lactate 2.9 (H) 0.4 - 2.0 mmol/L   Lactate   Result Value Ref Range    Lactate 2.8 (H) 0.4 - 2.0 mmol/L   Lactate   Result Value Ref Range    Lactate 3.4 (H) 0.4 - 2.0 mmol/L   Lactate   Result Value Ref Range    Lactate 3.1 (H) 0.4 - 2.0 mmol/L   CBC   Result Value Ref Range    WBC 15.5 (H) 4.4 - 11.3 x10*3/uL    nRBC 0.0 0.0 - 0.0 /100 WBCs    RBC 4.19 (L) 4.50 - 5.90 x10*6/uL    Hemoglobin 13.2 (L) 13.5 - 17.5 g/dL    Hematocrit 40.1 (L) 41.0 - 52.0 %    MCV 96 80 - 100 fL    MCH 31.5 26.0 - 34.0 pg    MCHC 32.9 32.0 - 36.0 g/dL    RDW 13.0 11.5 - 14.5 %    Platelets 114 (L) 150 - 450 x10*3/uL   Basic metabolic panel   Result Value Ref Range    Glucose 228 (H) 74 - 99 mg/dL    Sodium 134 (L) 136 - 145 mmol/L    Potassium  4.1 3.5 - 5.3 mmol/L    Chloride 101 98 - 107 mmol/L    Bicarbonate 24 21 - 32 mmol/L    Anion Gap 13 10 - 20 mmol/L    Urea Nitrogen 12 6 - 23 mg/dL    Creatinine 0.99 0.50 - 1.30 mg/dL    eGFR 88 >60 mL/min/1.73m*2    Calcium 8.3 (L) 8.6 - 10.3 mg/dL   POCT GLUCOSE   Result Value Ref Range    POCT Glucose 222 (H) 74 - 99 mg/dL       CT abdomen pelvis w IV contrast    Result Date: 6/9/2024  Interpreted By:  Cat Cochran, STUDY: CT ABDOMEN PELVIS W IV CONTRAST;  6/9/2024 3:58 am   INDICATION: Signs/Symptoms:fever, pelvic pain, lactic acidosis.   COMPARISON: None   ACCESSION NUMBER(S): [id number]   ORDERING CLINICIAN: ESTEBAN GUILLEN   TECHNIQUE: Axial CT of the abdomen and pelvis was performed. Coronal and sagittal reconstructions were performed.   Intravenous contrast material was administered without immediate complication.   FINDINGS: LOWER CHEST: No consolidation or pleural effusion.   ABDOMEN:   LIVER: The liver is enlarged measuring 25.8 cm in craniocaudal dimension and demonstrates diffusely decreased attenuation suggesting steatosis. There is a mildly nodular contour of the liver.   BILE DUCTS: No significant dilation.   GALLBLADDER: There is cholelithiasis.   PANCREAS: No peripancreatic inflammatory changes.   SPLEEN: The spleen is enlarged measuring 16.5 cm.   ADRENAL GLANDS: Unremarkable.   KIDNEYS AND URETERS: Symmetrical renal enhancement.  No hydronephrosis or hydroureter is identified. There is 1.8 cm heterogeneous lesion at the superior pole of the right kidney.   PELVIS:   BLADDER: The urinary bladder is partially distended with circumferential wall thickening and adjacent soft tissue stranding.   REPRODUCTIVE ORGANS: The prostate measures 4.0 cm in transverse diameter. The inflammatory changes from the urinary bladder extend to the prostate and the seminal vesicles.   BOWEL: The evaluation of the bowel is degraded by motion artifact. No bowel obstruction. The descending colon, the sigmoid  colon and the rectum are decompressed with diffuse wall thickening. The appendix is normal.   VESSELS: No abdominal aortic aneurysm.   PERITONEUM/RETROPERITONEUM/LYMPH NODES: No free fluid or free air.  No retroperitoneal hemorrhage. There is mild periportal adenopathy. A periportal lymph node measures 1.3 cm in short axis.   BONES AND ABDOMINAL WALL: Multilevel degenerative changes are noted in the spine. There is a small fat containing umbilical hernia.       1. Wall thickening of the urinary bladder with adjacent inflammatory changes, suggestive of cystitis. Please correlate with urinalysis. 2. The inflammatory changes from the urinary bladder extend to the prostate and the seminal vesicle, superimposed acute prostatitis and seminal vesiculitis are not excludable. 3. 1.8 cm heterogeneous lesion at the right kidney, renal neoplasm cannot be excluded. Nonemergent contrast-enhanced MRI recommended for further evaluation. 4. Colonic wall thickening, may be secondary to underdistention versus colitis. 5. Hepatosplenomegaly. Fatty infiltration of the liver. Mildly nodular contour of the liver. Is there any clinical history of cirrhosis? 6. Mild periportal adenopathy. 7. Cholelithiasis.     MACRO: Critical Finding:  See findings. Notification was initiated on 6/9/2024 at 4:19 am by  Cat Cochran.  (**-YCF-**) Instructions:   Signed by: Cat Cochran 6/9/2024 4:28 AM Dictation workstation:   [id number]    Assessment/Plan   Principal Problem:    UTI (urinary tract infection)  Active Problems:    Acute prostatitis    Lactate blood increase    Kidney lesion, native, right      1) probable acute prostatitis- no rectal exam at this time due to acute nature.  No bladder outlet obstruction.  Lactate was high, but I suspect the patient is profoundly dehydrated.  Will continue liberal fluids and antibiotic.  Patient is explained that he will likely need a longer course of antibiotics.      2) UTI- as above-- no new sexual  partners.  No urethral discharge.  No evidence of sexually transmitted disease.      3) DMII- continue current management           Cam Silverio MD

## 2024-06-09 NOTE — ED PROVIDER NOTES
HPI   Chief Complaint   Patient presents with    Groin Pain    Difficulty Urinating         History provided by:  Patient and medical records   used: No         This patient presents to the emergency department amatory via private vehicle complaining of dysuria, urgency, frequency and feeling that he is incompletely emptying his bladder.  Patient said it started this morning at [employer].  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  He tried to force fluids and a lot of cranberry juice today but it does seem like he got worse.  He says the end of his penis burns whenever he voids.  He has began having some pelvic pressure as well.  No scrotal pain.  No nausea or vomiting.  No back or flank pain.  Recent cough, congestion, URI symptoms.  No known history of prostate issues.  Denies possibility for sexually transmitted infection.    Patient's primary doctor is Tutu Perkins.  He sees him for hypertension and non-insulin-dependent diabetes.               Suman Coma Scale Score: 15                     Patient History   Past Medical History:   Diagnosis Date    Dysmetabolic syndrome X     Hypertension      History reviewed. No pertinent surgical history.  No family history on file.  Social History     Tobacco Use    Smoking status: Never    Smokeless tobacco: Never   Substance Use Topics    Alcohol use: Not on file    Drug use: Not on file       Physical Exam   ED Triage Vitals   Temperature Heart Rate Respirations BP   06/08/24 2224 06/08/24 2224 06/08/24 2224 06/08/24 2224   (!) 38.6 °C (101.5 °F) (!) 112 18 163/88      SpO2 Temp Source Heart Rate Source Patient Position   06/08/24 2224 06/09/24 0005 -- --   97 % Temporal        BP Location FiO2 (%)     -- --             Physical Exam  Vitals reviewed. Exam conducted with a chaperone present (RAMYA Hammonds RN).   Constitutional:       Appearance:  He is obese.   HENT:      Head: Normocephalic and atraumatic.      Right Ear: Tympanic membrane normal.      Left Ear: Tympanic membrane normal.      Nose: Nose normal.      Mouth/Throat:      Mouth: Mucous membranes are moist.   Eyes:      Extraocular Movements: Extraocular movements intact.      Pupils: Pupils are equal, round, and reactive to light.   Cardiovascular:      Rate and Rhythm: Regular rhythm. Tachycardia present.      Pulses: Normal pulses.      Heart sounds: Normal heart sounds.   Pulmonary:      Effort: Pulmonary effort is normal.      Breath sounds: Normal breath sounds.   Abdominal:      General: Bowel sounds are normal.      Palpations: Abdomen is soft.      Tenderness: There is no abdominal tenderness. There is no guarding or rebound.   Genitourinary:     Testes: Normal.      Comments: Penis is circumcised, retracted, no urethral discharge.  No rash or lesion.  No inguinal hernia or adenopathy.  No scrotal tenderness.  Bilateral testicles descended, intact cremasterics.  Musculoskeletal:         General: Normal range of motion.      Cervical back: Normal range of motion and neck supple.   Skin:     General: Skin is warm and dry.      Capillary Refill: Capillary refill takes less than 2 seconds.      Coloration: Skin is not jaundiced.   Neurological:      General: No focal deficit present.      Mental Status: He is alert and oriented to person, place, and time.   Psychiatric:         Mood and Affect: Mood normal.       Labs Reviewed   CBC WITH AUTO DIFFERENTIAL - Abnormal       Result Value    WBC 12.7 (*)     nRBC 0.0      RBC 4.58      Hemoglobin 14.7      Hematocrit 43.0      MCV 94      MCH 32.1      MCHC 34.2      RDW 12.5      Platelets 118 (*)     Neutrophils % 88.7      Immature Granulocytes %, Automated 0.5      Lymphocytes % 6.0      Monocytes % 4.3      Eosinophils % 0.2      Basophils % 0.3      Neutrophils Absolute 11.22 (*)     Immature Granulocytes Absolute, Automated 0.06       Lymphocytes Absolute 0.76 (*)     Monocytes Absolute 0.55      Eosinophils Absolute 0.02      Basophils Absolute 0.04     BASIC METABOLIC PANEL - Abnormal    Glucose 220 (*)     Sodium 133 (*)     Potassium 3.8      Chloride 99      Bicarbonate 24      Anion Gap 14      Urea Nitrogen 12      Creatinine 0.95      eGFR >90      Calcium 9.2     URINALYSIS WITH REFLEX CULTURE AND MICROSCOPIC - Abnormal    Color, Urine Yellow      Appearance, Urine Hazy (*)     Specific Gravity, Urine 1.018      pH, Urine 5.0      Protein, Urine 30 (1+) (*)     Glucose, Urine 50 (1+) (*)     Blood, Urine LARGE (3+) (*)     Ketones, Urine 5 (TRACE) (*)     Bilirubin, Urine NEGATIVE      Urobilinogen, Urine <2.0      Nitrite, Urine NEGATIVE      Leukocyte Esterase, Urine LARGE (3+) (*)    MICROSCOPIC ONLY, URINE - Abnormal    WBC, Urine >50 (*)     RBC, Urine >20 (*)     Bacteria, Urine 1+ (*)    LACTATE - Abnormal    Lactate 2.9 (*)     Narrative:     Venipuncture immediately after or during the administration of Metamizole may lead to falsely low results. Testing should be performed immediately  prior to Metamizole dosing.   LACTATE - Abnormal    Lactate 2.8 (*)     Narrative:     Venipuncture immediately after or during the administration of Metamizole may lead to falsely low results. Testing should be performed immediately  prior to Metamizole dosing.   LACTATE - Abnormal    Lactate 3.4 (*)     Narrative:     Venipuncture immediately after or during the administration of Metamizole may lead to falsely low results. Testing should be performed immediately  prior to Metamizole dosing.   URINE CULTURE   BLOOD CULTURE   GRAY TOP    Extra Tube Hold for add-ons.     URINALYSIS WITH REFLEX CULTURE AND MICROSCOPIC    Narrative:     The following orders were created for panel order Urinalysis with Reflex Culture and Microscopic.  Procedure                               Abnormality         Status                     ---------                                -----------         ------                     Urinalysis with Reflex C...[[id number]]  Abnormal            Final result               Extra Urine Gray Tube[[id number]]                            In process                   Please view results for these tests on the individual orders.   C. TRACHOMATIS + N. GONORRHOEAE, AMPLIFIED   EXTRA URINE BOSE TUBE   LACTATE   MORPHOLOGY    RBC Morphology See Below      Stomatocytes Few       CT abdomen pelvis w IV contrast   Final Result   1. Wall thickening of the urinary bladder with adjacent inflammatory   changes, suggestive of cystitis. Please correlate with urinalysis.   2. The inflammatory changes from the urinary bladder extend to the   prostate and the seminal vesicle, superimposed acute prostatitis and   seminal vesiculitis are not excludable.   3. 1.8 cm heterogeneous lesion at the right kidney, renal neoplasm   cannot be excluded. Nonemergent contrast-enhanced MRI recommended for   further evaluation.   4. Colonic wall thickening, may be secondary to underdistention   versus colitis.   5. Hepatosplenomegaly. Fatty infiltration of the liver. Mildly   nodular contour of the liver. Is there any clinical history of   cirrhosis?   6. Mild periportal adenopathy.   7. Cholelithiasis.             MACRO:   Critical Finding:  See findings. Notification was initiated on   6/9/2024 at 4:19 am by  Cat Cochran.  (**-YCF-**) Instructions:        Signed by: Cat Cochran 6/9/2024 4:28 AM   Dictation workstation:   [id number]        ED Medication Administration from 06/08/2024 2214 to 06/09/2024 0500         Date/Time Order Dose Route Action Action by     06/08/2024 2243 EDT acetaminophen (Tylenol) tablet 975 mg 975 mg oral Given REBEKA Johnson     06/08/2024 2243 EDT phenazopyridine (Pyridium) tablet 100 mg 100 mg oral Given REBEKA Johnson     06/08/2024 2243 EDT sodium chloride 0.9 % bolus 500 mL 500 mL intravenous New Bag REBEKA Johnson     06/08/2024 2244 EDT ketorolac  (Toradol) injection 15 mg 15 mg intravenous Given Alex, B     06/08/2024 2312 EDT cefTRIAXone (Rocephin) IVPB 1 g 1 g intravenous New Bag Alex, B     06/08/2024 2342 EDT cefTRIAXone (Rocephin) IVPB 1 g 0 g intravenous Stopped Alex, B     06/08/2024 2343 EDT sodium chloride 0.9 % bolus 500 mL 0 mL intravenous Stopped Alxe, B     06/08/2024 2356 EDT sodium chloride 0.9 % bolus 1,000 mL 1,000 mL intravenous New Bag Alex, B     06/09/2024 0056 EDT sodium chloride 0.9 % bolus 1,000 mL 0 mL intravenous Stopped Alex, B     06/09/2024 0234 EDT sodium chloride 0.9 % bolus 1,000 mL 1,000 mL intravenous New Bag Alex, B     06/09/2024 0340 EDT iohexol (OMNIPaque) 350 mg iodine/mL solution 75 mL 75 mL intravenous Given NAIMA Hardwick              ED Course & MDM   ED Course as of 06/09/24 0504   Sat Jun 08, 2024   2254 Patient reassessed, feeling much better [MN]   2347 Results reviewed, patient reassessed [MN]   Sun Jun 09, 2024   0150 Patient reassessed, feeling better [MN]   0232 Results reviewed, patient reassessed [MN]   0424 Patient reassessed [MN]      ED Course User Index  [MN] Aby Martinez MD         Diagnoses as of 06/09/24 0504   UTI (urinary tract infection)   Acute prostatitis   Lactate blood increase   Kidney lesion, native, right       Medical Decision Making  This patient presents emergency department the above history and physical.  Patient is febrile on arrival.  Clinical concern for sepsis but no evidence of shock.  Tylenol given for fever Toradol given for pain.  But given the pain.  IV fluids initiated.  Patient did produce a urine for analysis.  Postvoid bladder scan is 0.  Urinalysis is consistent with UTI.  IV Rocephin ordered.    Patient did have elevated lactate for which the IV hydration was given.  Despite this hydration lactate remains elevated leading concerns there may be some invasive infection/abscess/surgical process; therefore, CT scan abdomen pelvis with IV contrast  was obtained and read by radiology.  This does demonstrate cystitis, prostatitis possible seminal vesiculitis.  Feel patient would benefit from continued hydration and IV antibiotics.  Case was discussed with Cam Watson nurse practitioner for Dr. Ruiz hospitalist on-call tonight including past medical history, presenting signs and symptoms, current clinical addition test results.  He has graciously accepted patient for admission.    Incidental radiology finding of heterogeneous right renal mass was also communicated to patient and radiologist recommendation for 9 emergent outpatient MRI to evaluate.    Procedure  Procedures    Diagnoses as of 06/09/24 0504   UTI (urinary tract infection)   Acute prostatitis   Lactate blood increase   Kidney lesion, native, right        Aby Martinez MD  06/09/24 0504

## 2024-06-09 NOTE — ED NOTES
Patient states that burning pain with urination started this morning and the pain has now progressed to 8/10 with diminished urine production. Pain mainly in penis and entire groin area.      Stephon Hammonds RN  06/08/24 3127

## 2024-06-10 LAB
ATRIAL RATE: 138 BPM
BASOPHILS # BLD AUTO: 0.02 X10*3/UL (ref 0–0.1)
BASOPHILS NFR BLD AUTO: 0.2 %
C TRACH RRNA SPEC QL NAA+PROBE: NEGATIVE
EOSINOPHIL # BLD AUTO: 0.02 X10*3/UL (ref 0–0.7)
EOSINOPHIL NFR BLD AUTO: 0.2 %
ERYTHROCYTE [DISTWIDTH] IN BLOOD BY AUTOMATED COUNT: 13.1 % (ref 11.5–14.5)
GLUCOSE BLD MANUAL STRIP-MCNC: 153 MG/DL (ref 74–99)
GLUCOSE BLD MANUAL STRIP-MCNC: 162 MG/DL (ref 74–99)
GLUCOSE BLD MANUAL STRIP-MCNC: 188 MG/DL (ref 74–99)
GLUCOSE BLD MANUAL STRIP-MCNC: 200 MG/DL (ref 74–99)
HCT VFR BLD AUTO: 39.4 % (ref 41–52)
HGB BLD-MCNC: 12.9 G/DL (ref 13.5–17.5)
HOLD SPECIMEN: NORMAL
IMM GRANULOCYTES # BLD AUTO: 0.06 X10*3/UL (ref 0–0.7)
IMM GRANULOCYTES NFR BLD AUTO: 0.5 % (ref 0–0.9)
LACTATE SERPL-SCNC: 2.2 MMOL/L (ref 0.4–2)
LYMPHOCYTES # BLD AUTO: 0.69 X10*3/UL (ref 1.2–4.8)
LYMPHOCYTES NFR BLD AUTO: 6.1 %
MCH RBC QN AUTO: 32 PG (ref 26–34)
MCHC RBC AUTO-ENTMCNC: 32.7 G/DL (ref 32–36)
MCV RBC AUTO: 98 FL (ref 80–100)
MONOCYTES # BLD AUTO: 1.01 X10*3/UL (ref 0.1–1)
MONOCYTES NFR BLD AUTO: 9 %
N GONORRHOEA DNA SPEC QL PROBE+SIG AMP: NEGATIVE
NEUTROPHILS # BLD AUTO: 9.45 X10*3/UL (ref 1.2–7.7)
NEUTROPHILS NFR BLD AUTO: 84 %
NRBC BLD-RTO: 0 /100 WBCS (ref 0–0)
PLATELET # BLD AUTO: 95 X10*3/UL (ref 150–450)
Q ONSET: 202 MS
QRS COUNT: 23 BEATS
QRS DURATION: 124 MS
QT INTERVAL: 378 MS
QTC CALCULATION(BAZETT): 577 MS
QTC FREDERICIA: 501 MS
R AXIS: -51 DEGREES
RBC # BLD AUTO: 4.03 X10*6/UL (ref 4.5–5.9)
RBC MORPH BLD: NORMAL
T AXIS: 98 DEGREES
T OFFSET: 391 MS
VENTRICULAR RATE: 140 BPM
WBC # BLD AUTO: 11.3 X10*3/UL (ref 4.4–11.3)

## 2024-06-10 PROCEDURE — 2500000002 HC RX 250 W HCPCS SELF ADMINISTERED DRUGS (ALT 637 FOR MEDICARE OP, ALT 636 FOR OP/ED): Mod: IPSPLIT | Performed by: STUDENT IN AN ORGANIZED HEALTH CARE EDUCATION/TRAINING PROGRAM

## 2024-06-10 PROCEDURE — 36415 COLL VENOUS BLD VENIPUNCTURE: CPT | Mod: IPSPLIT | Performed by: STUDENT IN AN ORGANIZED HEALTH CARE EDUCATION/TRAINING PROGRAM

## 2024-06-10 PROCEDURE — 99232 SBSQ HOSP IP/OBS MODERATE 35: CPT | Performed by: STUDENT IN AN ORGANIZED HEALTH CARE EDUCATION/TRAINING PROGRAM

## 2024-06-10 PROCEDURE — 1210000001 HC SEMI-PRIVATE ROOM DAILY: Mod: IPSPLIT

## 2024-06-10 PROCEDURE — 94760 N-INVAS EAR/PLS OXIMETRY 1: CPT | Mod: IPSPLIT

## 2024-06-10 PROCEDURE — 2500000004 HC RX 250 GENERAL PHARMACY W/ HCPCS (ALT 636 FOR OP/ED): Mod: IPSPLIT

## 2024-06-10 PROCEDURE — 36415 COLL VENOUS BLD VENIPUNCTURE: CPT | Mod: IPSPLIT | Performed by: INTERNAL MEDICINE

## 2024-06-10 PROCEDURE — 87040 BLOOD CULTURE FOR BACTERIA: CPT | Mod: CONLAB | Performed by: STUDENT IN AN ORGANIZED HEALTH CARE EDUCATION/TRAINING PROGRAM

## 2024-06-10 PROCEDURE — 2500000004 HC RX 250 GENERAL PHARMACY W/ HCPCS (ALT 636 FOR OP/ED): Mod: IPSPLIT | Performed by: STUDENT IN AN ORGANIZED HEALTH CARE EDUCATION/TRAINING PROGRAM

## 2024-06-10 PROCEDURE — 83605 ASSAY OF LACTIC ACID: CPT | Mod: IPSPLIT | Performed by: STUDENT IN AN ORGANIZED HEALTH CARE EDUCATION/TRAINING PROGRAM

## 2024-06-10 PROCEDURE — 2500000001 HC RX 250 WO HCPCS SELF ADMINISTERED DRUGS (ALT 637 FOR MEDICARE OP): Mod: IPSPLIT | Performed by: INTERNAL MEDICINE

## 2024-06-10 PROCEDURE — 85025 COMPLETE CBC W/AUTO DIFF WBC: CPT | Mod: IPSPLIT | Performed by: INTERNAL MEDICINE

## 2024-06-10 PROCEDURE — 82947 ASSAY GLUCOSE BLOOD QUANT: CPT | Mod: IPSPLIT

## 2024-06-10 PROCEDURE — 2500000001 HC RX 250 WO HCPCS SELF ADMINISTERED DRUGS (ALT 637 FOR MEDICARE OP): Mod: IPSPLIT | Performed by: NURSE PRACTITIONER

## 2024-06-10 RX ORDER — SODIUM CHLORIDE 9 MG/ML
INJECTION, SOLUTION INTRAVENOUS
Status: COMPLETED
Start: 2024-06-10 | End: 2024-06-10

## 2024-06-10 RX ORDER — DEXTROSE 50 % IN WATER (D50W) INTRAVENOUS SYRINGE
12.5
Status: DISCONTINUED | OUTPATIENT
Start: 2024-06-10 | End: 2024-06-11 | Stop reason: HOSPADM

## 2024-06-10 RX ORDER — DEXTROSE 50 % IN WATER (D50W) INTRAVENOUS SYRINGE
25
Status: DISCONTINUED | OUTPATIENT
Start: 2024-06-10 | End: 2024-06-11 | Stop reason: HOSPADM

## 2024-06-10 RX ORDER — SIMETHICONE 80 MG
80 TABLET,CHEWABLE ORAL 4 TIMES DAILY PRN
Status: DISCONTINUED | OUTPATIENT
Start: 2024-06-10 | End: 2024-06-11 | Stop reason: HOSPADM

## 2024-06-10 RX ORDER — LEVOFLOXACIN 5 MG/ML
INJECTION, SOLUTION INTRAVENOUS
Status: COMPLETED
Start: 2024-06-10 | End: 2024-06-10

## 2024-06-10 RX ORDER — INSULIN LISPRO 100 [IU]/ML
0-10 INJECTION, SOLUTION INTRAVENOUS; SUBCUTANEOUS
Status: DISCONTINUED | OUTPATIENT
Start: 2024-06-10 | End: 2024-06-11 | Stop reason: HOSPADM

## 2024-06-10 RX ORDER — LEVOFLOXACIN 5 MG/ML
750 INJECTION, SOLUTION INTRAVENOUS EVERY 24 HOURS
Status: DISCONTINUED | OUTPATIENT
Start: 2024-06-10 | End: 2024-06-11 | Stop reason: HOSPADM

## 2024-06-10 RX ADMIN — SODIUM CHLORIDE 100 ML/HR: 9 INJECTION, SOLUTION INTRAVENOUS at 08:39

## 2024-06-10 RX ADMIN — ENOXAPARIN SODIUM 40 MG: 40 INJECTION SUBCUTANEOUS at 08:29

## 2024-06-10 RX ADMIN — LISINOPRIL 2.5 MG: 2.5 TABLET ORAL at 21:00

## 2024-06-10 RX ADMIN — METOPROLOL SUCCINATE 50 MG: 50 TABLET, EXTENDED RELEASE ORAL at 21:00

## 2024-06-10 RX ADMIN — LEVOFLOXACIN 750 MG: 5 INJECTION, SOLUTION INTRAVENOUS at 09:09

## 2024-06-10 RX ADMIN — FAMOTIDINE 20 MG: 20 TABLET ORAL at 08:29

## 2024-06-10 RX ADMIN — FAMOTIDINE 20 MG: 20 TABLET ORAL at 21:01

## 2024-06-10 RX ADMIN — ACETAMINOPHEN 650 MG: 325 TABLET ORAL at 21:01

## 2024-06-10 RX ADMIN — INSULIN LISPRO 2 UNITS: 100 INJECTION, SOLUTION INTRAVENOUS; SUBCUTANEOUS at 12:24

## 2024-06-10 RX ADMIN — LEVOFLOXACIN 750 MG: 750 INJECTION, SOLUTION INTRAVENOUS at 09:09

## 2024-06-10 RX ADMIN — ACETAMINOPHEN 650 MG: 325 TABLET ORAL at 12:39

## 2024-06-10 RX ADMIN — GLIPIZIDE 2.5 MG: 5 TABLET ORAL at 06:00

## 2024-06-10 RX ADMIN — INSULIN LISPRO 2 UNITS: 100 INJECTION, SOLUTION INTRAVENOUS; SUBCUTANEOUS at 17:10

## 2024-06-10 ASSESSMENT — PAIN - FUNCTIONAL ASSESSMENT: PAIN_FUNCTIONAL_ASSESSMENT: 0-10

## 2024-06-10 ASSESSMENT — COGNITIVE AND FUNCTIONAL STATUS - GENERAL
DAILY ACTIVITIY SCORE: 24
CLIMB 3 TO 5 STEPS WITH RAILING: A LITTLE
MOBILITY SCORE: 23

## 2024-06-10 ASSESSMENT — PAIN SCALES - GENERAL
PAINLEVEL_OUTOF10: 3
PAINLEVEL_OUTOF10: 0 - NO PAIN
PAINLEVEL_OUTOF10: 0 - NO PAIN

## 2024-06-10 NOTE — CARE PLAN
The patient has had an uneventful shift, the patient had been medicated with tylenol once this shift for headache. Patient tolerated IVABX and IVF well throughout the shift. The patient's vss on room air throughout the shift. Patient has not been tachycardic throughout the duration of the shift. The patient is resting in bed comfortably without complaints or requests at this time. The call bell is within reach, will continue to monitor and will continue the plan of care as previously stated.

## 2024-06-10 NOTE — CARE PLAN
The patient's goals for the shift include      The clinical goals for the shift include Patient will tolerate IV ATB and fluids throughout the shift    Over the shift, the patient did tolerate IV meds, patient had an uneventful shift. Patient temains independent in room, call light in reach.

## 2024-06-10 NOTE — NURSING NOTE
This nurse assumed care at this time. Patient is up in chair, ordering lunch and remains independent in room. Patient denies discomfort at this time, call light in reach.

## 2024-06-10 NOTE — PROGRESS NOTES
"Subjective   Cam Baird is a 58 y.o. male on day 1 of admission presenting with UTI (urinary tract infection).    Patient reports feeling overall better but continues to have urinary frequency. Denies dysuria. Also states to have abdominal bloating.    Overnight Events: None    Objective   Vital Signs:  Blood pressure 118/67, pulse 72, temperature 37.7 °C (99.8 °F), temperature source Temporal, resp. rate 18, height 1.875 m (6' 1.82\"), weight (!) 162 kg (357 lb 5.9 oz), SpO2 95%.    Physical Exam  Constitutional:       Appearance: Normal appearance.   HENT:      Mouth/Throat:      Mouth: Mucous membranes are moist.   Eyes:      Extraocular Movements: Extraocular movements intact.      Conjunctiva/sclera: Conjunctivae normal.   Cardiovascular:      Rate and Rhythm: Normal rate and regular rhythm.      Pulses: Normal pulses.      Heart sounds: Normal heart sounds.   Pulmonary:      Effort: Pulmonary effort is normal.      Breath sounds: Normal breath sounds.   Abdominal:      General: Abdomen is flat. Bowel sounds are normal.      Palpations: Abdomen is soft.      Tenderness: There is no abdominal tenderness.   Musculoskeletal:         General: Normal range of motion.   Skin:     General: Skin is warm.   Neurological:      General: No focal deficit present.      Mental Status: He is alert and oriented to person, place, and time.   Psychiatric:         Mood and Affect: Mood normal.         Behavior: Behavior normal.         Thought Content: Thought content normal.         Judgment: Judgment normal.         Wt Readings from Last 6 Encounters:   06/09/24 (!) 162 kg (357 lb 5.9 oz)       I/Os    Intake/Output Summary (Last 24 hours) at 6/10/2024 1009  Last data filed at 6/10/2024 0909  Gross per 24 hour   Intake 4163.34 ml   Output 2150 ml   Net 2013.34 ml       Labs:   Results for orders placed or performed during the hospital encounter of 06/08/24 (from the past 24 hour(s))   POCT GLUCOSE   Result Value Ref Range    " POCT Glucose 228 (H) 74 - 99 mg/dL   POCT GLUCOSE   Result Value Ref Range    POCT Glucose 200 (H) 74 - 99 mg/dL   CBC and Auto Differential   Result Value Ref Range    WBC 11.3 4.4 - 11.3 x10*3/uL    nRBC 0.0 0.0 - 0.0 /100 WBCs    RBC 4.03 (L) 4.50 - 5.90 x10*6/uL    Hemoglobin 12.9 (L) 13.5 - 17.5 g/dL    Hematocrit 39.4 (L) 41.0 - 52.0 %    MCV 98 80 - 100 fL    MCH 32.0 26.0 - 34.0 pg    MCHC 32.7 32.0 - 36.0 g/dL    RDW 13.1 11.5 - 14.5 %    Platelets 95 (L) 150 - 450 x10*3/uL    Neutrophils % 84.0 40.0 - 80.0 %    Immature Granulocytes %, Automated 0.5 0.0 - 0.9 %    Lymphocytes % 6.1 13.0 - 44.0 %    Monocytes % 9.0 2.0 - 10.0 %    Eosinophils % 0.2 0.0 - 6.0 %    Basophils % 0.2 0.0 - 2.0 %    Neutrophils Absolute 9.45 (H) 1.20 - 7.70 x10*3/uL    Immature Granulocytes Absolute, Automated 0.06 0.00 - 0.70 x10*3/uL    Lymphocytes Absolute 0.69 (L) 1.20 - 4.80 x10*3/uL    Monocytes Absolute 1.01 (H) 0.10 - 1.00 x10*3/uL    Eosinophils Absolute 0.02 0.00 - 0.70 x10*3/uL    Basophils Absolute 0.02 0.00 - 0.10 x10*3/uL   Morphology   Result Value Ref Range    RBC Morphology No significant RBC morphology present    Green Top   Result Value Ref Range    Extra Tube Hold for add-ons.        Imaging:  Electrocardiogram, 12-lead PRN ACS symptoms    Result Date: 6/10/2024  Wide QRS tachycardia Left anterior fascicular block Nonspecific ST and T wave abnormality Abnormal ECG No previous ECGs available    CT abdomen pelvis w IV contrast    Result Date: 6/9/2024  Interpreted By:  Cat Cochran, STUDY: CT ABDOMEN PELVIS W IV CONTRAST;  6/9/2024 3:58 am   INDICATION: Signs/Symptoms:fever, pelvic pain, lactic acidosis.   COMPARISON: None   ACCESSION NUMBER(S): [id number]   ORDERING CLINICIAN: ESTEBAN GUILLEN   TECHNIQUE: Axial CT of the abdomen and pelvis was performed. Coronal and sagittal reconstructions were performed.   Intravenous contrast material was administered without immediate complication.   FINDINGS: LOWER  CHEST: No consolidation or pleural effusion.   ABDOMEN:   LIVER: The liver is enlarged measuring 25.8 cm in craniocaudal dimension and demonstrates diffusely decreased attenuation suggesting steatosis. There is a mildly nodular contour of the liver.   BILE DUCTS: No significant dilation.   GALLBLADDER: There is cholelithiasis.   PANCREAS: No peripancreatic inflammatory changes.   SPLEEN: The spleen is enlarged measuring 16.5 cm.   ADRENAL GLANDS: Unremarkable.   KIDNEYS AND URETERS: Symmetrical renal enhancement.  No hydronephrosis or hydroureter is identified. There is 1.8 cm heterogeneous lesion at the superior pole of the right kidney.   PELVIS:   BLADDER: The urinary bladder is partially distended with circumferential wall thickening and adjacent soft tissue stranding.   REPRODUCTIVE ORGANS: The prostate measures 4.0 cm in transverse diameter. The inflammatory changes from the urinary bladder extend to the prostate and the seminal vesicles.   BOWEL: The evaluation of the bowel is degraded by motion artifact. No bowel obstruction. The descending colon, the sigmoid colon and the rectum are decompressed with diffuse wall thickening. The appendix is normal.   VESSELS: No abdominal aortic aneurysm.   PERITONEUM/RETROPERITONEUM/LYMPH NODES: No free fluid or free air.  No retroperitoneal hemorrhage. There is mild periportal adenopathy. A periportal lymph node measures 1.3 cm in short axis.   BONES AND ABDOMINAL WALL: Multilevel degenerative changes are noted in the spine. There is a small fat containing umbilical hernia.       1. Wall thickening of the urinary bladder with adjacent inflammatory changes, suggestive of cystitis. Please correlate with urinalysis. 2. The inflammatory changes from the urinary bladder extend to the prostate and the seminal vesicle, superimposed acute prostatitis and seminal vesiculitis are not excludable. 3. 1.8 cm heterogeneous lesion at the right kidney, renal neoplasm cannot be excluded.  Nonemergent contrast-enhanced MRI recommended for further evaluation. 4. Colonic wall thickening, may be secondary to underdistention versus colitis. 5. Hepatosplenomegaly. Fatty infiltration of the liver. Mildly nodular contour of the liver. Is there any clinical history of cirrhosis? 6. Mild periportal adenopathy. 7. Cholelithiasis.     MACRO: Critical Finding:  See findings. Notification was initiated on 6/9/2024 at 4:19 am by  Cat Cochran.  (**-YCF-**) Instructions:   Signed by: Cat Cochran 6/9/2024 4:28 AM Dictation workstation:   [id number]     Medications:    Current Facility-Administered Medications:     acetaminophen (Tylenol) tablet 650 mg, 650 mg, oral, q4h PRN, 650 mg at 06/09/24 2108 **OR** [DISCONTINUED] acetaminophen (Tylenol) oral liquid 650 mg, 650 mg, nasogastric tube, q4h PRN **OR** [DISCONTINUED] acetaminophen (Tylenol) suppository 650 mg, 650 mg, rectal, q4h PRN, CAITLYN Bello-CNP    enoxaparin (Lovenox) syringe 40 mg, 40 mg, subcutaneous, q24h, eLx Ruiz DO, 40 mg at 06/10/24 0829    famotidine (Pepcid) tablet 20 mg, 20 mg, oral, BID, 20 mg at 06/10/24 0829 **OR** [DISCONTINUED] famotidine PF (Pepcid) injection 20 mg, 20 mg, intravenous, BID, ARACELI Bello    glipiZIDE (Glucotrol) tablet 2.5 mg, 2.5 mg, oral, Daily before breakfast, Cam Silverio MD, 2.5 mg at 06/10/24 0600    levoFLOXacin (Levaquin)  mg, 750 mg, intravenous, q24h, Lex Ruiz DO, Last Rate: 100 mL/hr at 06/10/24 0909, 750 mg at 06/10/24 0909    lisinopril tablet 2.5 mg, 2.5 mg, oral, Daily, Cam Silverio MD    melatonin tablet 3 mg, 3 mg, oral, Nightly PRN, ARACELI Bello    metoprolol succinate XL (Toprol-XL) 24 hr tablet 50 mg, 50 mg, oral, Daily, Cam Silverio MD    ondansetron (Zofran) tablet 4 mg, 4 mg, oral, q8h PRN **OR** [DISCONTINUED] ondansetron (Zofran) injection 4 mg, 4 mg, intravenous, q8h PRN, ARACELI Bello    simethicone (Mylicon) chewable tablet  80 mg, 80 mg, oral, 4x daily PRN, Lex Ruiz DO    sodium chloride 0.9% infusion, 100 mL/hr, intravenous, Continuous, Cam Watson, APRN-CNP, Last Rate: 100 mL/hr at 06/10/24 0839, 100 mL/hr at 06/10/24 0839    Assessment & Plan    Cam Baird is a 58 y.o. male on day 1 of admission to [facility] for management of complicated UTI    Complicated UTI 2/2 E. Coli  Prostatitis  - Switch IV antibiotic to IV Levaquin for improved prostate tissue penetration  - Urine culture growing E. Coli, will await culture sensitivities     3.   DM-II: continue glipizide    Disposition: Requires additional medical management with IV antibiotics for treatment of likely prostatitis.     Moderate level of MDM based on above medical conditions & discussion of plan.    Lex Ruiz DO  Internal Medicine

## 2024-06-11 VITALS
TEMPERATURE: 99.5 F | HEART RATE: 68 BPM | OXYGEN SATURATION: 97 % | SYSTOLIC BLOOD PRESSURE: 109 MMHG | DIASTOLIC BLOOD PRESSURE: 61 MMHG | RESPIRATION RATE: 16 BRPM | HEIGHT: 74 IN | WEIGHT: 315 LBS | BODY MASS INDEX: 40.43 KG/M2

## 2024-06-11 PROBLEM — I10 ESSENTIAL HYPERTENSION: Status: ACTIVE | Noted: 2017-02-07

## 2024-06-11 PROBLEM — E66.812 OBESITY, CLASS II, BMI 35-39.9: Status: ACTIVE | Noted: 2018-06-18

## 2024-06-11 PROBLEM — E66.9 OBESITY, CLASS II, BMI 35-39.9: Status: ACTIVE | Noted: 2018-06-18

## 2024-06-11 PROBLEM — R79.89 LACTATE BLOOD INCREASE: Status: RESOLVED | Noted: 2024-06-09 | Resolved: 2024-06-11

## 2024-06-11 LAB
BACTERIA BLD AEROBE CULT: ABNORMAL
BACTERIA BLD CULT: ABNORMAL
GLUCOSE BLD MANUAL STRIP-MCNC: 136 MG/DL (ref 74–99)
GLUCOSE BLD MANUAL STRIP-MCNC: 142 MG/DL (ref 74–99)
GLUCOSE BLD MANUAL STRIP-MCNC: 163 MG/DL (ref 74–99)
GRAM STN SPEC: ABNORMAL
GRAM STN SPEC: ABNORMAL

## 2024-06-11 PROCEDURE — 2500000001 HC RX 250 WO HCPCS SELF ADMINISTERED DRUGS (ALT 637 FOR MEDICARE OP): Mod: IPSPLIT | Performed by: INTERNAL MEDICINE

## 2024-06-11 PROCEDURE — 82947 ASSAY GLUCOSE BLOOD QUANT: CPT | Mod: IPSPLIT

## 2024-06-11 PROCEDURE — 2500000001 HC RX 250 WO HCPCS SELF ADMINISTERED DRUGS (ALT 637 FOR MEDICARE OP): Mod: IPSPLIT | Performed by: NURSE PRACTITIONER

## 2024-06-11 PROCEDURE — 2500000004 HC RX 250 GENERAL PHARMACY W/ HCPCS (ALT 636 FOR OP/ED): Mod: IPSPLIT

## 2024-06-11 PROCEDURE — 2500000004 HC RX 250 GENERAL PHARMACY W/ HCPCS (ALT 636 FOR OP/ED): Mod: IPSPLIT | Performed by: STUDENT IN AN ORGANIZED HEALTH CARE EDUCATION/TRAINING PROGRAM

## 2024-06-11 PROCEDURE — 94760 N-INVAS EAR/PLS OXIMETRY 1: CPT | Mod: IPSPLIT

## 2024-06-11 PROCEDURE — 99239 HOSP IP/OBS DSCHRG MGMT >30: CPT | Performed by: STUDENT IN AN ORGANIZED HEALTH CARE EDUCATION/TRAINING PROGRAM

## 2024-06-11 RX ORDER — SODIUM CHLORIDE 9 MG/ML
INJECTION, SOLUTION INTRAVENOUS
Status: COMPLETED
Start: 2024-06-11 | End: 2024-06-11

## 2024-06-11 RX ORDER — LEVOFLOXACIN 500 MG/1
500 TABLET, FILM COATED ORAL DAILY
Qty: 25 TABLET | Refills: 0 | Status: SHIPPED | OUTPATIENT
Start: 2024-06-11 | End: 2024-07-06

## 2024-06-11 RX ORDER — ACETAMINOPHEN 325 MG/1
650 TABLET ORAL EVERY 4 HOURS PRN
Status: DISCONTINUED | OUTPATIENT
Start: 2024-06-11 | End: 2024-06-11 | Stop reason: HOSPADM

## 2024-06-11 RX ORDER — METFORMIN HYDROCHLORIDE 1000 MG/1
1000 TABLET ORAL
Qty: 30 TABLET | Refills: 3 | Status: SHIPPED | OUTPATIENT
Start: 2024-06-11 | End: 2024-10-09

## 2024-06-11 RX ADMIN — ACETAMINOPHEN 650 MG: 325 TABLET ORAL at 01:24

## 2024-06-11 RX ADMIN — ENOXAPARIN SODIUM 40 MG: 40 INJECTION SUBCUTANEOUS at 08:55

## 2024-06-11 RX ADMIN — LEVOFLOXACIN 750 MG: 750 INJECTION, SOLUTION INTRAVENOUS at 08:55

## 2024-06-11 RX ADMIN — FAMOTIDINE 20 MG: 20 TABLET ORAL at 08:55

## 2024-06-11 RX ADMIN — ACETAMINOPHEN 650 MG: 325 TABLET ORAL at 09:02

## 2024-06-11 RX ADMIN — SODIUM CHLORIDE 100 ML/HR: 9 INJECTION, SOLUTION INTRAVENOUS at 01:11

## 2024-06-11 RX ADMIN — GLIPIZIDE 2.5 MG: 5 TABLET ORAL at 06:16

## 2024-06-11 ASSESSMENT — PAIN - FUNCTIONAL ASSESSMENT
PAIN_FUNCTIONAL_ASSESSMENT: 0-10

## 2024-06-11 ASSESSMENT — PAIN DESCRIPTION - LOCATION
LOCATION: HEAD
LOCATION: HEAD

## 2024-06-11 ASSESSMENT — PAIN SCALES - GENERAL
PAINLEVEL_OUTOF10: 0 - NO PAIN
PAINLEVEL_OUTOF10: 3
PAINLEVEL_OUTOF10: 1
PAINLEVEL_OUTOF10: 3

## 2024-06-11 NOTE — PROGRESS NOTES
06/11/24 1010   Discharge Planning   Patient expects to be discharged to: home   Does the patient need discharge transport arranged? No     Pt is not interested in applying to Medicaid expansion due to a large sum of money form the recent sale of his property. SW sent message back to Deneen at [facility]. He is to be discharged today and feels he has everything he needs to manage his health at home. ZIGGY Judge

## 2024-06-11 NOTE — CARE PLAN
The patient's goals for the shift include      The clinical goals for the shift include Pt will remain afebrile this shift    Over the shift, the patient was febrile  T max 102.1  VSS  c/o headache and generalized malaise this shift  Tolerating IVF as ordered  Independent in room  Tolerates activity well  Call light in reach

## 2024-06-11 NOTE — DISCHARGE SUMMARY
Discharge Diagnosis  UTI (urinary tract infection)    Issues Requiring Follow-Up  Urology follow-up, PCP establishment, [facility] enrollment    Test Results Pending At Discharge  Pending Labs       Order Current Status    Blood Culture Preliminary result    Blood Culture Preliminary result            Hospital Course  Cam Baird is a 58 year old male with PMHx significant for HTN, DM-II, Obesity Class III, who was admitted to [facility] for management of complicated UTI resulting in prostatitis. He was initially treated with IV Rocephin and was then transitioned to IV Levaquin due to pan sensitive E.coli on urine.     The CT A/P with IV contrast obtained revealed a 1.8 cm heterogenous lesion of superior pole on right kidney as well as likely prostatitis + seminal vesiculitis. He will need urology follow-up.    Patient was discharged with 4 week course of PO Levaquin 500 mg once daily. Metformin 1000 mg once daily was also prescribed for improved diabetic control (A1c 8.7%)    More than 35 minutes were spent in coordinating patient discharge.    Pertinent Physical Exam At Time of Discharge  Physical Exam  Constitutional:       Appearance: He is obese.   HENT:      Head: Normocephalic and atraumatic.      Mouth/Throat:      Mouth: Mucous membranes are moist.   Eyes:      Extraocular Movements: Extraocular movements intact.      Conjunctiva/sclera: Conjunctivae normal.   Cardiovascular:      Rate and Rhythm: Normal rate and regular rhythm.      Pulses: Normal pulses.      Heart sounds: Normal heart sounds.   Pulmonary:      Effort: Pulmonary effort is normal.      Breath sounds: Normal breath sounds.   Abdominal:      General: Abdomen is flat. Bowel sounds are normal.      Palpations: Abdomen is soft.   Skin:     General: Skin is warm.   Neurological:      General: No focal deficit present.      Mental Status: He is alert and oriented to person, place, and time.   Psychiatric:         Mood and Affect: Mood normal.          Behavior: Behavior normal.         Thought Content: Thought content normal.         Judgment: Judgment normal.         Home Medications     Medication List      START taking these medications     levoFLOXacin 500 mg tablet; Commonly known as: Levaquin; Take 1 tablet   (500 mg) by mouth once daily for 25 days.   metFORMIN 1,000 mg tablet; Commonly known as: Glucophage; Take 1 tablet   (1,000 mg) by mouth once daily with breakfast.     CONTINUE taking these medications     glyBURIDE 5 mg tablet; Commonly known as: Diabeta   lisinopril 2.5 mg tablet   metoprolol succinate XL 50 mg 24 hr tablet; Commonly known as: Toprol-XL       Outpatient Follow-Up  No future appointments.    Lex Ruiz, DO

## 2024-06-11 NOTE — NURSING NOTE
Patient discharged home with brother. He has healthy at home to follow up with him and he will be scheduling with his primary when he gets home. I reviewed his prescriptions and he has no further questions at this time.

## 2024-06-11 NOTE — DISCHARGE INSTRUCTIONS
It was nice meeting you. We spoke about Medicaid Expansion documents given by Deneen at [facility] [phone]. Call her if you have any questions about your hospital bill. Take Care, María Beltran, [facility]   Care Transitions Coordinator  [phone]

## 2024-06-12 ENCOUNTER — PATIENT OUTREACH (OUTPATIENT)
Dept: HOME HEALTH SERVICES | Age: 59
End: 2024-06-12

## 2024-06-12 SDOH — SOCIAL STABILITY: SOCIAL INSECURITY: WITHIN THE LAST YEAR, HAVE YOU BEEN AFRAID OF YOUR PARTNER OR EX-PARTNER?: NO

## 2024-06-12 SDOH — HEALTH STABILITY: MENTAL HEALTH: HOW OFTEN DO YOU HAVE A DRINK CONTAINING ALCOHOL?: NEVER

## 2024-06-12 SDOH — ECONOMIC STABILITY: TRANSPORTATION INSECURITY
IN THE PAST 12 MONTHS, HAS THE LACK OF TRANSPORTATION KEPT YOU FROM MEDICAL APPOINTMENTS OR FROM GETTING MEDICATIONS?: NO

## 2024-06-12 SDOH — ECONOMIC STABILITY: HOUSING INSECURITY
IN THE LAST 12 MONTHS, WAS THERE A TIME WHEN YOU DID NOT HAVE A STEADY PLACE TO SLEEP OR SLEPT IN A SHELTER (INCLUDING NOW)?: NO

## 2024-06-12 SDOH — SOCIAL STABILITY: SOCIAL INSECURITY
WITHIN THE LAST YEAR, HAVE TO BEEN RAPED OR FORCED TO HAVE ANY KIND OF SEXUAL ACTIVITY BY YOUR PARTNER OR EX-PARTNER?: NO

## 2024-06-12 SDOH — HEALTH STABILITY: MENTAL HEALTH
HOW OFTEN DO YOU NEED TO HAVE SOMEONE HELP YOU WHEN YOU READ INSTRUCTIONS, PAMPHLETS, OR OTHER WRITTEN MATERIAL FROM YOUR DOCTOR OR PHARMACY?: RARELY

## 2024-06-12 SDOH — SOCIAL STABILITY: SOCIAL INSECURITY
WITHIN THE LAST YEAR, HAVE YOU BEEN KICKED, HIT, SLAPPED, OR OTHERWISE PHYSICALLY HURT BY YOUR PARTNER OR EX-PARTNER?: NO

## 2024-06-12 SDOH — ECONOMIC STABILITY: FOOD INSECURITY: WITHIN THE PAST 12 MONTHS, YOU WORRIED THAT YOUR FOOD WOULD RUN OUT BEFORE YOU GOT MONEY TO BUY MORE.: NEVER TRUE

## 2024-06-12 SDOH — SOCIAL STABILITY: SOCIAL INSECURITY: WITHIN THE LAST YEAR, HAVE YOU BEEN HUMILIATED OR EMOTIONALLY ABUSED IN OTHER WAYS BY YOUR PARTNER OR EX-PARTNER?: NO

## 2024-06-12 SDOH — ECONOMIC STABILITY: INCOME INSECURITY: IN THE LAST 12 MONTHS, WAS THERE A TIME WHEN YOU WERE NOT ABLE TO PAY THE MORTGAGE OR RENT ON TIME?: NO

## 2024-06-12 SDOH — ECONOMIC STABILITY: FOOD INSECURITY: WITHIN THE PAST 12 MONTHS, THE FOOD YOU BOUGHT JUST DIDN'T LAST AND YOU DIDN'T HAVE MONEY TO GET MORE.: NEVER TRUE

## 2024-06-12 SDOH — HEALTH STABILITY: MENTAL HEALTH: HOW MANY STANDARD DRINKS CONTAINING ALCOHOL DO YOU HAVE ON A TYPICAL DAY?: PATIENT DOES NOT DRINK

## 2024-06-12 SDOH — SOCIAL STABILITY: SOCIAL NETWORK: IN A TYPICAL WEEK, HOW MANY TIMES DO YOU TALK ON THE PHONE WITH FAMILY, FRIENDS, OR NEIGHBORS?: NEVER

## 2024-06-12 SDOH — ECONOMIC STABILITY: INCOME INSECURITY: IN THE PAST 12 MONTHS, HAS THE ELECTRIC, GAS, OIL, OR WATER COMPANY THREATENED TO SHUT OFF SERVICE IN YOUR HOME?: NO

## 2024-06-12 SDOH — HEALTH STABILITY: PHYSICAL HEALTH

## 2024-06-12 SDOH — HEALTH STABILITY: MENTAL HEALTH: HOW OFTEN DO YOU HAVE 6 OR MORE DRINKS ON ONE OCCASION?: NEVER

## 2024-06-12 SDOH — ECONOMIC STABILITY: INCOME INSECURITY: HOW HARD IS IT FOR YOU TO PAY FOR THE VERY BASICS LIKE FOOD, HOUSING, MEDICAL CARE, AND HEATING?: NOT VERY HARD

## 2024-06-12 SDOH — SOCIAL STABILITY: SOCIAL NETWORK
DO YOU BELONG TO ANY CLUBS OR ORGANIZATIONS SUCH AS CHURCH GROUPS UNIONS, FRATERNAL OR ATHLETIC GROUPS, OR SCHOOL GROUPS?: NO

## 2024-06-12 SDOH — HEALTH STABILITY: MENTAL HEALTH
STRESS IS WHEN SOMEONE FEELS TENSE, NERVOUS, ANXIOUS, OR CAN'T SLEEP AT NIGHT BECAUSE THEIR MIND IS TROUBLED. HOW STRESSED ARE YOU?: NOT AT ALL

## 2024-06-12 SDOH — SOCIAL STABILITY: SOCIAL NETWORK: HOW OFTEN DO YOU ATTENT MEETINGS OF THE CLUB OR ORGANIZATION YOU BELONG TO?: NEVER

## 2024-06-12 SDOH — SOCIAL STABILITY: SOCIAL NETWORK: ARE YOU MARRIED, WIDOWED, DIVORCED, SEPARATED, NEVER MARRIED, OR LIVING WITH A PARTNER?: NEVER MARRIED

## 2024-06-12 SDOH — ECONOMIC STABILITY: HOUSING INSECURITY: IN THE LAST 12 MONTHS, HOW MANY PLACES HAVE YOU LIVED?: 1

## 2024-06-12 SDOH — HEALTH STABILITY: PHYSICAL HEALTH: ON AVERAGE, HOW MANY DAYS PER WEEK DO YOU ENGAGE IN MODERATE TO STRENUOUS EXERCISE (LIKE A BRISK WALK)?: 7 DAYS

## 2024-06-12 SDOH — SOCIAL STABILITY: SOCIAL NETWORK: HOW OFTEN DO YOU ATTEND CHURCH OR RELIGIOUS SERVICES?: MORE THAN 4 TIMES PER YEAR

## 2024-06-12 SDOH — SOCIAL STABILITY: SOCIAL NETWORK: HOW OFTEN DO YOU GET TOGETHER WITH FRIENDS OR RELATIVES?: MORE THAN THREE TIMES A WEEK

## 2024-06-12 SDOH — ECONOMIC STABILITY: TRANSPORTATION INSECURITY
IN THE PAST 12 MONTHS, HAS LACK OF TRANSPORTATION KEPT YOU FROM MEETINGS, WORK, OR FROM GETTING THINGS NEEDED FOR DAILY LIVING?: NO

## 2024-06-12 ASSESSMENT — LIFESTYLE VARIABLES
AUDIT-C TOTAL SCORE: 0
SKIP TO QUESTIONS 9-10: 1

## 2024-06-12 NOTE — PROGRESS NOTES
Patient agreeable to HHVC (Healthy at Home). Enrollment call completed and program overview explained to patient, with appropriate numbers/info given. Initial visit is 6/15/24 at 2pm d/t limited hhvc scheduled available.    Pt was admitted to [facility] from 6/9-6/11 for complicated UTI --> prostatitis, d/c on levaquin for 25 days with uro follow up (for imaging lesion results as well). Pt has pmhx of HTN DMII Obesity, checking his bs once a day and whenever he feels he needs to. Pt newly started on PO metformin for A1c 8.7%. Pt sent home w a referral for new  PCP, NAIMA Serrano. Pt is very eager to be compliant and follow-up with care. Of note, pt lives at home with his brother [place], and appears they share the same mobile number. No remarkable SDOH or conerns during enrollment call.

## 2024-06-13 ENCOUNTER — PATIENT OUTREACH (OUTPATIENT)
Dept: HOME HEALTH SERVICES | Age: 59
End: 2024-06-13

## 2024-06-13 NOTE — PROGRESS NOTES
Daily Call Note:   Patients states everything is going fine today.      Blood sugar 140 this morning.    Patient has had morning elevated blood sugar readings.  Patient is not taking Metformin 1000 mg BID, due to upset stomach.  Patient is only taking Glyburide 5 mg BID.   Notified Chito and Jacqueline Whyte      /63  HR 68    New Patient Urology appointment 6/20/24 @ 10:20 with Dr. Noam Hinojosa    New Patient PCP Appointment.  6/21/24 @ 10:20 with Dr. Maru Fermin      Pt Education:   Barriers:   Topics for Daily Review:   Pt demonstrates clear understanding:     Daily Weight:  There were no vitals filed for this visit.   Last 3 Weights:  Wt Readings from Last 7 Encounters:   06/09/24 (!) 162 kg (357 lb 5.9 oz)       Masimo Device:    Masimo Clinical Impression:     Virtual Visits--Scheduled (Most Recent Date at Top)  Follow up Appointments  Recent Visits  No visits were found meeting these conditions.  Showing recent visits within past 30 days and meeting all other requirements  Future Appointments  No visits were found meeting these conditions.  Showing future appointments within next 90 days and meeting all other requirements       Frequency of RN Calls & Virtual Visits per Team Agreement:     Medication issues Addressed (what was done):     Follow up appointments scheduled by [facility] Staff:   Referrals made by [facility] staff:

## 2024-06-14 ENCOUNTER — PATIENT OUTREACH (OUTPATIENT)
Dept: HOME HEALTH SERVICES | Age: 59
End: 2024-06-14

## 2024-06-14 LAB — BACTERIA BLD CULT: NORMAL

## 2024-06-14 NOTE — PROGRESS NOTES
"Spoke with pt- He is not home now and he did not check his blood sugar this morning. I asked how his stomach was and he said \"ok\", just a little bloated. I also asked if he restarted metformin and he said \"no, I don't feel that well yet, I will hold off for a bit more time.\" Pt aware HHVC tomorrow at 1400. Pt informed to write down any questions or concerns to discuss.         Patient's Address:   [address] [place] [id number]  **  If this is not the address patient will receive services - alert team and address in EMR**       Patient Contacts:  Extended Emergency Contact Information  Primary Emergency Contact: RAINE RICHARDSON  Mobile Phone: [phone]  Relation: Brother  Preferred language: English   needed? No                                Patient's Preferred Phone: [phone]  Patient's E-mail: No e-mail address on record                                      "

## 2024-06-15 ENCOUNTER — PATIENT OUTREACH (OUTPATIENT)
Dept: HOME HEALTH SERVICES | Age: 59
End: 2024-06-15

## 2024-06-15 ENCOUNTER — TELEMEDICINE CLINICAL SUPPORT (OUTPATIENT)
Dept: CARE COORDINATION | Age: 59
End: 2024-06-15

## 2024-06-15 VITALS — SYSTOLIC BLOOD PRESSURE: 115 MMHG | HEART RATE: 63 BPM | DIASTOLIC BLOOD PRESSURE: 58 MMHG

## 2024-06-15 DIAGNOSIS — N34.2 INFECTIVE URETHRITIS: Primary | ICD-10-CM

## 2024-06-15 DIAGNOSIS — I10 ESSENTIAL HYPERTENSION: ICD-10-CM

## 2024-06-15 RX ORDER — METOPROLOL SUCCINATE 50 MG/1
50 TABLET, EXTENDED RELEASE ORAL DAILY
Qty: 30 TABLET | Refills: 2 | Status: SHIPPED | OUTPATIENT
Start: 2024-06-15 | End: 2024-09-13

## 2024-06-15 RX ORDER — LISINOPRIL 2.5 MG/1
2.5 TABLET ORAL DAILY
Qty: 30 TABLET | Refills: 2 | Status: SHIPPED | OUTPATIENT
Start: 2024-06-15 | End: 2024-09-13

## 2024-06-15 NOTE — PROGRESS NOTES
Daily Call Note: [facility] Initial completed with Dr. Peterson on the call.    The patient stated that he is feeling very much better today - has no complaints other than an occasional burning sensation. He stated that he needs a refill on the metoprolol and lisinopril. Dr. Peterson placed order for those.     He tried to schedule appointments with new PCP but both cancelled his appointments after he scheduled them. We will follow up to get PCP appointment arranged.    The patient has not checked his weight, but does have a scale. He will begin daily weights starting tomorrow. /58 and HR 63    Pt Education: prevention of hypoglycemia - taking prescribed medication - he clarified that he had never started taking the metformin, and that he had not experienced GI adverse effects - he had read about the medications and is hesistant to take because he does not want to experience those. He stated that he will begin taking it in approximately a week. He has taken glyburide in the past, and has had hypoglycemia using it, during late evening. He was resistant to taking it earlier in the day with early breakfast stating that it interfered with going to [employer] daily and communion. He was educated on the importance of monitoring his BG at least twice daily (more if able), BP and weight, and keeping a log of that information for his providers.  Barriers: no  Topics for Daily Review: glucose monitoring - has agreed to monitor in AM before breakfast and PM before dinner.   Pt demonstrates clear understanding: Yes      6/15/24 1548 - addendum - nurse contacted central scheduling to enlist assistance regarding arrangements for new PCP - she confirmed that PCP listed, Maru Fermin is not taking new patients, as patient stated - she was able to locate a physician taking new patients near the patient - Dr. Rodrigo Rutherford who she stated had a location in [place] - she stated that the patient would have to call central scheduling  to provide payment up front to book the appointment. The patient confirmed that he is self-pay for all of his medical appointments. The patient was given this information and will call to gather more information and see if he wants to schedule. He stated that also he would not qualify for medicaid insurance. Social Work will be messaged to follow up on assisting the patient to obtain Health Insurance.     6/15/24 6136 Addendum -the patient called back to state that he is going to try to set up an appointment with his former PCP Tutu Perkins. He will call that office on Monday morning.  Daily Weight:  There were no vitals filed for this visit.  The patient has not weighed himself but will begin daily weights starting tomorrow.  Last 3 Weights:  Wt Readings from Last 7 Encounters:   06/09/24 (!) 162 kg (357 lb 5.9 oz)           Virtual Visits--Scheduled (Most Recent Date at Top)  Follow up Appointments  Recent Visits  No visits were found meeting these conditions.  Showing recent visits within past 30 days and meeting all other requirements  Future Appointments  No visits were found meeting these conditions.  Showing future appointments within next 90 days and meeting all other requirements       Frequency of RN Calls & Virtual Visits per Team Agreement: Healthy at Home Frequency: Daily    Medication issues Addressed (what was done):     Follow up appointments scheduled by [facility] Staff: attempted to scheduled new PCP but patient is without insurance  Referrals made by [facility] staff:

## 2024-06-17 ENCOUNTER — TELEPHONE (OUTPATIENT)
Dept: CASE MANAGEMENT | Facility: HOSPITAL | Age: 59
End: 2024-06-17

## 2024-06-17 ENCOUNTER — PATIENT OUTREACH (OUTPATIENT)
Dept: HOME HEALTH SERVICES | Age: 59
End: 2024-06-17

## 2024-06-17 VITALS
DIASTOLIC BLOOD PRESSURE: 71 MMHG | HEART RATE: 65 BPM | WEIGHT: 315 LBS | SYSTOLIC BLOOD PRESSURE: 126 MMHG | BODY MASS INDEX: 44.33 KG/M2

## 2024-06-17 NOTE — TELEPHONE ENCOUNTER
Patient was referred to social work on this date to determine what options he may have for applying for medical insurance. He was seen inpatient and stated the following: Pt is not interested in applying to Medicaid expansion due to a large sum of money form the recent sale of his property.     I called and left a message for the patient  today.

## 2024-06-17 NOTE — PROGRESS NOTES
Daily call completed patient is feeling good better each day vitals today 126/71 HR 65  Weight 343.6 states his scripts are ready at the pharmacy he just needs to pick them up no other medication needs questions and concerns addressed he is going to try and get re established with Dr Perkins his previous PCP because it would be a lot less co pay I will reach out to  to see if the can assist with helping him find marketplace/private insurance since he did not qualify for medicaid

## 2024-06-18 ENCOUNTER — PATIENT OUTREACH (OUTPATIENT)
Dept: HOME HEALTH SERVICES | Age: 59
End: 2024-06-18

## 2024-06-18 VITALS
WEIGHT: 315 LBS | HEART RATE: 64 BPM | BODY MASS INDEX: 44.58 KG/M2 | SYSTOLIC BLOOD PRESSURE: 119 MMHG | DIASTOLIC BLOOD PRESSURE: 60 MMHG

## 2024-06-18 NOTE — PROGRESS NOTES
Daily call completed patient states he feels really good today vitals  /60 HR 64 Weight 345.5 no sob or cp no medication needs questions and concerns addressed reminded of up coming appointment he call SW work back and left a message

## 2024-06-19 ENCOUNTER — PATIENT OUTREACH (OUTPATIENT)
Dept: HOME HEALTH SERVICES | Age: 59
End: 2024-06-19

## 2024-06-19 NOTE — PROGRESS NOTES
Daily Call Note:     Pt Education: Educated on beginning to make healthier food choices - incorporating fruits, healthy nuts such as walnuts, etc into his diet - to start considering what foods he can start with to assist in the anti-diabetic behaviors. He stated that he will begin to use the Metformin on Monday 6/24. Since he is feeling better, he is no longer alarmed about the potential GI adverse effects. He was instructed that his blood sugars need better management and with the addition of the Metformin he might move into that range. He was also told that there are many other options for managing diabetes if it turns out that he cannot tolerate Metformin. He reported BG as 228 at 0930  The patient confirmed that he has an appointment tomorrow with Urology, and also has an appointment on Monday 6/24 with Dr. Tutu Perkins (which does not show in Epic).  Barriers: inconsistent monitoring and recording of VS, Wt, and BG.  The patient stated that he had returned the call to Social Work re: insurance, but has not heard back. He was encouraged to follow up and call again.  Pt demonstrates clear understanding: Yes    Daily Weight:  There were no vitals filed for this visit.  No VS or weight today  Last 3 Weights:  Wt Readings from Last 7 Encounters:   06/18/24 (!) 157 kg (345 lb 8 oz)   06/17/24 (!) 156 kg (343 lb 9.6 oz)   06/09/24 (!) 162 kg (357 lb 5.9 oz)       Masimo Device:    Masimo Clinical Impression:     Virtual Visits--Scheduled (Most Recent Date at Top)  Follow up Appointments  Recent Visits  No visits were found meeting these conditions.  Showing recent visits within past 30 days and meeting all other requirements  Future Appointments  No visits were found meeting these conditions.  Showing future appointments within next 90 days and meeting all other requirements       Frequency of RN Calls & Virtual Visits per Team Agreement:     Medication issues Addressed (what was done):     Follow up appointments  scheduled by [facility] Staff:   Referrals made by [facility] staff:

## 2024-06-20 ENCOUNTER — APPOINTMENT (OUTPATIENT)
Dept: LAB | Facility: LAB | Age: 59
End: 2024-06-20

## 2024-06-20 ENCOUNTER — OFFICE VISIT (OUTPATIENT)
Dept: UROLOGY | Facility: HOSPITAL | Age: 59
End: 2024-06-20

## 2024-06-20 ENCOUNTER — PATIENT OUTREACH (OUTPATIENT)
Dept: HOME HEALTH SERVICES | Age: 59
End: 2024-06-20

## 2024-06-20 DIAGNOSIS — N41.0 ACUTE PROSTATITIS: ICD-10-CM

## 2024-06-20 DIAGNOSIS — N40.0 BENIGN PROSTATIC HYPERPLASIA, UNSPECIFIED WHETHER LOWER URINARY TRACT SYMPTOMS PRESENT: Primary | ICD-10-CM

## 2024-06-20 DIAGNOSIS — N39.0 ACUTE LOWER UTI: ICD-10-CM

## 2024-06-20 DIAGNOSIS — N28.9 KIDNEY LESION, NATIVE, RIGHT: ICD-10-CM

## 2024-06-20 PROCEDURE — 99214 OFFICE O/P EST MOD 30 MIN: CPT | Performed by: UROLOGY

## 2024-06-20 PROCEDURE — 87086 URINE CULTURE/COLONY COUNT: CPT | Performed by: UROLOGY

## 2024-06-20 PROCEDURE — 51798 US URINE CAPACITY MEASURE: CPT | Performed by: UROLOGY

## 2024-06-20 NOTE — PROGRESS NOTES
"NPV     HISTORY OF PRESENT ILLNESS:   Cam Baird is a 58 y.o. male who is being seen as a new patient today for right renal lesion.    PAST MEDICAL HISTORY:  Past Medical History:   Diagnosis Date    Dysmetabolic syndrome X     Hypertension      PAST SURGICAL HISTORY:  No past surgical history on file.     ALLERGIES:   Allergies   Allergen Reactions    Doxycycline Hcl Shortness of breath    Guaifenesin Shortness of breath        MEDICATIONS:   Current Outpatient Medications   Medication Instructions    glyBURIDE (DIABETA) 5 mg, oral, 2 times daily PRN    levoFLOXacin (LEVAQUIN) 500 mg, oral, Daily    lisinopril 2.5 mg, oral, Daily    metFORMIN (GLUCOPHAGE) 1,000 mg, oral, Daily with breakfast    metoprolol succinate XL (TOPROL-XL) 50 mg, oral, Daily        PHYSICAL EXAM:  There were no vitals taken for this visit.  Constitutional: Patient appears well-developed and well-nourished. No distress.    Pulmonary/Chest: Effort normal. No respiratory distress.   Abdominal: Soft, ND NT  : WNL  Musculoskeletal: Normal range of motion.    Neurological: Alert and oriented to person, place, and time.  Psychiatric: Normal mood and affect. Behavior is normal. Thought content normal.      Labs:  No results found for: \"TESTOSTERONE\"  No results found for: \"PSA\"  No components found for: \"CBC\"  Lab Results   Component Value Date    CREATININE 0.99 06/09/2024     No components found for: \"TESTOTMS\"  No results found for: \"TESTF\"    Imaging:  - Renal CT on 6/9/24 demonstrated a 1.8 cm heterogeneous lesion at the right kidney, renal neoplasm cannot be excluded. Nonemergent contrast-enhanced MRI recommended for further evaluation.  -Results reviewed with patient. Patient reassured.      Discussion:  Doing well, no complaints. Denies any dysuria, hematuria, bothersome urinary frequency, urgency, or flank pain. Patient denies any pushing or straining to urinate. Feels he fully emptying his bladder. Reports he had a recent UTI with " hospital admission for treatment and CT revealed a right renal lesion. Discussed results with the patient, reassured patient that the size of lesion is safe to monitor. Pt was advised to finishe abx as directed and follow up with PCP. Recommended to have a cystoscopy and to repeat imaging in 6 months to monitor.    PVR is 2cc  Assessment:      1. Acute prostatitis  Referral to Urology      2. Kidney lesion, native, right  Referral to Urology        Cam Baird is a 58 y.o. male here for NPV     Plan:   Will plan for cystoscopy now, pt will schedule accordingly.  Will also plan for CT Urogram in 6 months.  All questions and concerns were addressed. Patient verbalizes understanding and has no other questions at this time.      Scribe Attestation  By signing my name below, IEvi Scribe   attest that this documentation has been prepared under the direction and in the presence of Noam Johnston MD.

## 2024-06-20 NOTE — PROGRESS NOTES
Daily Call Note:     Daily call completed with the patient.  He states he is doing okay and on his way back from a urology appt.  He states they want to do a cystoscopy  next week. Then repeat imaging of his right kidney again in about 6 months.  Per the uro notes during hospitalization a lesion of the right kidney was noticed.  He did not check his vitals today because he left out early to get to his appt.  PT had no questions or concerns during the call.     Pt Education:   Barriers:   Topics for Daily Review:   Pt demonstrates clear understanding:    Daily Weight:  There were no vitals filed for this visit.   Last 3 Weights:  Wt Readings from Last 7 Encounters:   06/18/24 (!) 157 kg (345 lb 8 oz)   06/17/24 (!) 156 kg (343 lb 9.6 oz)   06/09/24 (!) 162 kg (357 lb 5.9 oz)       Masimo Device:    Masimo Clinical Impression:     Virtual Visits--Scheduled (Most Recent Date at Top)  Follow up Appointments  Recent Visits  No visits were found meeting these conditions.  Showing recent visits within past 30 days and meeting all other requirements  Future Appointments  No visits were found meeting these conditions.  Showing future appointments within next 90 days and meeting all other requirements       Frequency of RN Calls & Virtual Visits per Team Agreement:    Medication issues Addressed (what was done):     Follow up appointments scheduled by [facility] Staff:  Referrals made by [facility] staff:

## 2024-06-21 ENCOUNTER — APPOINTMENT (OUTPATIENT)
Dept: PRIMARY CARE | Facility: CLINIC | Age: 59
End: 2024-06-21

## 2024-06-21 ENCOUNTER — PATIENT OUTREACH (OUTPATIENT)
Dept: HOME HEALTH SERVICES | Age: 59
End: 2024-06-21

## 2024-06-21 VITALS
DIASTOLIC BLOOD PRESSURE: 61 MMHG | WEIGHT: 315 LBS | SYSTOLIC BLOOD PRESSURE: 111 MMHG | BODY MASS INDEX: 44.33 KG/M2 | HEART RATE: 65 BPM

## 2024-06-21 LAB — BACTERIA UR CULT: NO GROWTH

## 2024-06-21 NOTE — PROGRESS NOTES
Daily Call Note: Daily call complete. Patient states he is doing better today. He reports his blood sugar today was 205. When reviewing medications, he states that he has not started the metformin 1000 mg daily because he read all of the side effects and wasn't feeling very good already that day, so he didn't start it yet. I reviewed reason for metformin and encouraged him to take as prescribed and to notify us if he has any side effects. He said he would probably start on Monday. Next [facility] 6/22/24 at 1400, verbalized understanding. No other questions at this time.    /61   Pulse 65   Wt (!) 156 kg (343 lb 9.6 oz)   BMI 44.33 kg/m²       Pt Education: per POC  Barriers: none  Topics for Daily Review: blood sugar  Pt demonstrates clear understanding: Yes    Daily Weight:  Vitals:    06/21/24 1412   Weight: (!) 156 kg (343 lb 9.6 oz)      Last 3 Weights:  Wt Readings from Last 7 Encounters:   06/21/24 (!) 156 kg (343 lb 9.6 oz)   06/18/24 (!) 157 kg (345 lb 8 oz)   06/17/24 (!) 156 kg (343 lb 9.6 oz)   06/09/24 (!) 162 kg (357 lb 5.9 oz)       Masimo Device: No   Masimo Clinical Impression: n/a    Virtual Visits--Scheduled (Most Recent Date at Top)  Follow up Appointments  Recent Visits  No visits were found meeting these conditions.  Showing recent visits within past 30 days and meeting all other requirements  Future Appointments  No visits were found meeting these conditions.  Showing future appointments within next 90 days and meeting all other requirements       Frequency of RN Calls & Virtual Visits per Team Agreement: Healthy at Home Frequency: Daily    Medication issues Addressed (what was done): see note    Follow up appointments scheduled by [facility] Staff: none  Referrals made by [facility] staff: none

## 2024-06-22 ENCOUNTER — PATIENT OUTREACH (OUTPATIENT)
Dept: HOME HEALTH SERVICES | Age: 59
End: 2024-06-22

## 2024-06-22 ENCOUNTER — APPOINTMENT (OUTPATIENT)
Dept: CARE COORDINATION | Age: 59
End: 2024-06-22

## 2024-06-22 VITALS — SYSTOLIC BLOOD PRESSURE: 119 MMHG | HEART RATE: 68 BPM | DIASTOLIC BLOOD PRESSURE: 62 MMHG

## 2024-06-22 NOTE — PROGRESS NOTES
Weekly HHVC with pt, Dr Ross and myself. Pt is still in middle of taking several antibiotics. Recent appt with urology and feel pt had infection and would like to do a cystoscopy. 201 morning glucose today. Pt is on glyburide, has not started metformin- Plan to start taking Monday. Checks sugar daily in morning. Pt has PCP appt with Dr Tutu Perkins this Monday. Hoping to get morning blood sugars to around 100 in the morning. BP today 119/62, HR 68. Pt has left a message with  for insurance coverage ( I will also message SW to follow up Monday). If pt can get insurance coverage, may  think of starting ozempic in future. [facility], please make sure Chito can be on the call.           Patient's Address:   [address] [place] [address] [address]  **  If this is not the address patient will receive services - alert team and address in EMR**       Patient Contacts:  Extended Emergency Contact Information  Primary Emergency Contact: RAINE RICHARDSON  Mobile Phone: [phone]  Relation: Brother  Preferred language: English   needed? No                                Patient's Preferred Phone: [phone]  Patient's E-mail: No e-mail address on record

## 2024-06-25 ENCOUNTER — PATIENT OUTREACH (OUTPATIENT)
Dept: HOME HEALTH SERVICES | Age: 59
End: 2024-06-25

## 2024-06-25 NOTE — PROGRESS NOTES
Spoke with pt, he states he is feeling good. 215 blood sugar this morning. Pt started taking metformin today. [facility] 6/28, daily calls changed to M-W-F.       Patient's Address:   [facility] [address]  [facility] [address]  **  If this is not the address patient will receive services - alert team and address in EMR**       Patient Contacts:  Extended Emergency Contact Information  Primary Emergency Contact: RAINE RICHARDSON  Mobile Phone: [phone]  Relation: Brother  Preferred language: English   needed? No                                Patient's Preferred Phone: [phone]  Patient's E-mail: No e-mail address on record

## 2024-06-26 ENCOUNTER — PATIENT OUTREACH (OUTPATIENT)
Dept: HOME HEALTH SERVICES | Age: 59
End: 2024-06-26

## 2024-06-26 VITALS
DIASTOLIC BLOOD PRESSURE: 64 MMHG | BODY MASS INDEX: 44.76 KG/M2 | SYSTOLIC BLOOD PRESSURE: 114 MMHG | WEIGHT: 315 LBS | HEART RATE: 63 BPM

## 2024-06-26 NOTE — PROGRESS NOTES
Daily Call Note: Daily call completed - the patient stated he is feeling quite well. He began taking the Metformin yesterday morning, and again today. He has gotten over the dread of potential side effects    Pt Education: educated on improving dietary choices and incorporating more fruits and vegetables into his diet. He was instructed on the harm caused by chronically elevated blood glucose level, and bringing those levels under control with proper diet, activity and medication.   Barriers: none  Topics for Daily Review: management of blood glucose and medications.  Pt demonstrates clear understanding: Yes    The patient also verbalized concerns about managing his medical bill. He was encouraged to contact the number listed on the bill for questions about finances, and the  was contacted as well as he has questions still regarding obtaining health insurance. She confirmed she will call him tomorrow.   /64   Pulse 63   Wt (!) 157 kg (346 lb 14.4 oz)   BMI 44.76 kg/m²     Daily Weight:  There were no vitals filed for this visit.   Last 3 Weights:  Wt Readings from Last 7 Encounters:   06/21/24 (!) 156 kg (343 lb 9.6 oz)   06/18/24 (!) 157 kg (345 lb 8 oz)   06/17/24 (!) 156 kg (343 lb 9.6 oz)   06/09/24 (!) 162 kg (357 lb 5.9 oz)           Virtual Visits--Scheduled (Most Recent Date at Top)  Follow up Appointments  Recent Visits  No visits were found meeting these conditions.  Showing recent visits within past 30 days and meeting all other requirements  Future Appointments  No visits were found meeting these conditions.  Showing future appointments within next 90 days and meeting all other requirements       Frequency of RN Calls & Virtual Visits per Team Agreement: Healthy at Home Frequency: MWF    Medication issues Addressed (what was done):     Follow up appointments scheduled by [facility] Staff:   Referrals made by [facility] staff:

## 2024-06-27 ENCOUNTER — TELEPHONE (OUTPATIENT)
Dept: CASE MANAGEMENT | Facility: HOSPITAL | Age: 59
End: 2024-06-27

## 2024-06-27 NOTE — TELEPHONE ENCOUNTER
I spoke with patient about his current medical bills. He indicated he has about 10,000 in bills with no insurance.   I suggested he attempt to try to apply for medicaid and he agreed. I sent an email to [facility] requesting they follow up with him for the application.   I also suggested to the patient he could purchase health care insurance in the marketplace.   He will attempt to apply for medicaid and if denied will purchase insurance on the marketplace.

## 2024-06-28 ENCOUNTER — PATIENT OUTREACH (OUTPATIENT)
Dept: HOME HEALTH SERVICES | Age: 59
End: 2024-06-28

## 2024-06-28 ENCOUNTER — APPOINTMENT (OUTPATIENT)
Dept: CARE COORDINATION | Age: 59
End: 2024-06-28

## 2024-06-28 VITALS
HEART RATE: 57 BPM | SYSTOLIC BLOOD PRESSURE: 111 MMHG | BODY MASS INDEX: 44.44 KG/M2 | DIASTOLIC BLOOD PRESSURE: 68 MMHG | WEIGHT: 315 LBS

## 2024-06-28 DIAGNOSIS — N34.2 INFECTIVE URETHRITIS: ICD-10-CM

## 2024-06-28 DIAGNOSIS — I10 ESSENTIAL HYPERTENSION: Primary | ICD-10-CM

## 2024-06-28 NOTE — PROGRESS NOTES
Daily Call Note:   [facility] COMPLETED WITH DNP ARCHIE MCPHERSON AND YI GALLO     Patient states he is feeling fine.     /68  P 57   Glucose  194    .4       [facility] July 5th @ 10:00    Patient started Metformin on June 25, 2024    Patient will log BP morning in evening.      The [facility] contact SW for information to sign up for Marketplace.       [facility] 7/5/24 @ 1230          Pt Education:   Barriers:   Topics for Daily Review:   Pt demonstrates clear understanding:     Daily Weight:  There were no vitals filed for this visit.   Last 3 Weights:  Wt Readings from Last 7 Encounters:   06/26/24 (!) 157 kg (346 lb 14.4 oz)   06/21/24 (!) 156 kg (343 lb 9.6 oz)   06/18/24 (!) 157 kg (345 lb 8 oz)   06/17/24 (!) 156 kg (343 lb 9.6 oz)   06/09/24 (!) 162 kg (357 lb 5.9 oz)       Masimo Device:    Masimo Clinical Impression:     Virtual Visits--Scheduled (Most Recent Date at Top)  Follow up Appointments  Recent Visits  No visits were found meeting these conditions.  Showing recent visits within past 30 days and meeting all other requirements  Future Appointments  No visits were found meeting these conditions.  Showing future appointments within next 90 days and meeting all other requirements       Frequency of RN Calls & Virtual Visits per Team Agreement:     Medication issues Addressed (what was done):     Follow up appointments scheduled by [facility] Staff:   Referrals made by [facility] staff:

## 2024-07-01 ENCOUNTER — APPOINTMENT (OUTPATIENT)
Dept: PRIMARY CARE | Facility: CLINIC | Age: 59
End: 2024-07-01

## 2024-07-01 ENCOUNTER — PATIENT OUTREACH (OUTPATIENT)
Dept: HOME HEALTH SERVICES | Age: 59
End: 2024-07-01

## 2024-07-01 VITALS
HEART RATE: 63 BPM | BODY MASS INDEX: 44.41 KG/M2 | SYSTOLIC BLOOD PRESSURE: 122 MMHG | DIASTOLIC BLOOD PRESSURE: 65 MMHG | WEIGHT: 315 LBS

## 2024-07-01 NOTE — PROGRESS NOTES
Daily Call Note:   Spoke to patient he reports feeling good. Reports BG, weight and vitals. Reviewed any new symptoms with new metformin, he replied no. If anything he feels a little constipated, but still having BM's.  Appetite fine. Denies any questions or concerns. Next calls reviewed.   Pt Education: POC  Barriers: na  Topics for Daily Review: POC  Pt demonstrates clear understanding: Yes    Daily Weight:  Vitals:    07/01/24 1300   Weight: (!) 156 kg (344 lb 3.2 oz)      Last 3 Weights:  Wt Readings from Last 7 Encounters:   07/01/24 (!) 156 kg (344 lb 3.2 oz)   06/28/24 (!) 156 kg (344 lb 6.4 oz)   06/26/24 (!) 157 kg (346 lb 14.4 oz)   06/21/24 (!) 156 kg (343 lb 9.6 oz)   06/18/24 (!) 157 kg (345 lb 8 oz)   06/17/24 (!) 156 kg (343 lb 9.6 oz)   06/09/24 (!) 162 kg (357 lb 5.9 oz)       Masimo Device: No   Masimo Clinical Impression: na    Virtual Visits--Scheduled (Most Recent Date at Top)  Follow up Appointments  Recent Visits  No visits were found meeting these conditions.  Showing recent visits within past 30 days and meeting all other requirements  Future Appointments  No visits were found meeting these conditions.  Showing future appointments within next 90 days and meeting all other requirements       Frequency of RN Calls & Virtual Visits per Team Agreement: Healthy at Home Frequency: Bi-Weekly    Medication issues Addressed (what was done): giancarlo    Follow up appointments scheduled by [facility] Staff: giancarlo  Referrals made by [facility] staff: giancarlo

## 2024-07-02 LAB
ATRIAL RATE: 138 BPM
Q ONSET: 202 MS
QRS COUNT: 23 BEATS
QRS DURATION: 124 MS
QT INTERVAL: 378 MS
QTC CALCULATION(BAZETT): 577 MS
QTC FREDERICIA: 501 MS
R AXIS: -51 DEGREES
T AXIS: 98 DEGREES
T OFFSET: 391 MS
VENTRICULAR RATE: 140 BPM

## 2024-07-03 ENCOUNTER — PATIENT OUTREACH (OUTPATIENT)
Dept: HOME HEALTH SERVICES | Age: 59
End: 2024-07-03

## 2024-07-03 NOTE — PROGRESS NOTES
Spoke with pt for daily call. Pt states he is doing good. Glucose 203 this morning. 117/63, 62 HR, wt 343.8. Pt started metformin last Tuesday, no side effects. No other questions or concerns. Verified [facility] 7/5 @ 1230       Patient's Address:   [place] [address]  **  If this is not the address patient will receive services - alert team and address in EMR**       Patient Contacts:  Extended Emergency Contact Information  Primary Emergency Contact: RAINE RICHARDSON  Mobile Phone: [phone]  Relation: Brother  Preferred language: English   needed? No                                Patient's Preferred Phone: [phone]  Patient's E-mail: No e-mail address on record

## 2024-07-05 ENCOUNTER — APPOINTMENT (OUTPATIENT)
Dept: CARE COORDINATION | Age: 59
End: 2024-07-05

## 2024-07-05 ENCOUNTER — PATIENT OUTREACH (OUTPATIENT)
Dept: HOME HEALTH SERVICES | Age: 59
End: 2024-07-05

## 2024-07-05 DIAGNOSIS — N34.2 INFECTIVE URETHRITIS: ICD-10-CM

## 2024-07-05 DIAGNOSIS — E11.9 TYPE 2 DIABETES MELLITUS WITHOUT COMPLICATION, WITHOUT LONG-TERM CURRENT USE OF INSULIN (MULTI): ICD-10-CM

## 2024-07-05 DIAGNOSIS — I10 ESSENTIAL HYPERTENSION: Primary | ICD-10-CM

## 2024-07-05 NOTE — PROGRESS NOTES
Weekly HHVC with Dr Salcedo, pt and myself.  today, yest 165 and at 6pm 117. /69, HR 59 before medications. Pt has no dizziness or lightheadedness. Next HHVC 7/12 @ 1230.      Patient's [place]   [facility] [address]  **  If this is not the address patient will receive services - alert team and address in EMR**       Patient Contacts:  Extended Emergency Contact Information  Primary Emergency Contact: RAINE RICHARDSON  Mobile Phone: [phone]  Relation: Brother  Preferred language: English   needed? No                                Patient's Preferred Phone: [phone]  Patient's E-mail: No e-mail address on record

## 2024-07-08 ENCOUNTER — PATIENT OUTREACH (OUTPATIENT)
Dept: HOME HEALTH SERVICES | Age: 59
End: 2024-07-08

## 2024-07-08 VITALS
WEIGHT: 315 LBS | DIASTOLIC BLOOD PRESSURE: 67 MMHG | BODY MASS INDEX: 44.31 KG/M2 | SYSTOLIC BLOOD PRESSURE: 127 MMHG | HEART RATE: 57 BPM

## 2024-07-08 NOTE — PROGRESS NOTES
Daily call completed patient states he is doing well FBG this am was a little high 213 discussed ideal range patient states he still feels a little shaky when he get to the low 100s weight today 343.4 /67 HR 57 no medication needs questions and concerns addressed reviewed up coming appointments

## 2024-07-10 ENCOUNTER — PATIENT OUTREACH (OUTPATIENT)
Dept: HOME HEALTH SERVICES | Age: 59
End: 2024-07-10

## 2024-07-10 NOTE — PROGRESS NOTES
Daily call completed patient was out so he did not have all his vitals numbers he states his weight was 343 and FBG was 198 no medication needs or questions

## 2024-07-12 ENCOUNTER — APPOINTMENT (OUTPATIENT)
Dept: CARE COORDINATION | Age: 59
End: 2024-07-12

## 2024-07-12 ENCOUNTER — TELEMEDICINE (OUTPATIENT)
Dept: PHARMACY | Facility: HOSPITAL | Age: 59
End: 2024-07-12

## 2024-07-12 VITALS
BODY MASS INDEX: 44.25 KG/M2 | SYSTOLIC BLOOD PRESSURE: 116 MMHG | WEIGHT: 315 LBS | DIASTOLIC BLOOD PRESSURE: 71 MMHG | HEART RATE: 54 BPM

## 2024-07-12 DIAGNOSIS — I10 ESSENTIAL HYPERTENSION: ICD-10-CM

## 2024-07-12 DIAGNOSIS — N34.2 INFECTIVE URETHRITIS: ICD-10-CM

## 2024-07-12 DIAGNOSIS — I10 ESSENTIAL HYPERTENSION: Primary | ICD-10-CM

## 2024-07-12 DIAGNOSIS — E11.9 TYPE 2 DIABETES MELLITUS WITHOUT COMPLICATION, WITHOUT LONG-TERM CURRENT USE OF INSULIN (MULTI): ICD-10-CM

## 2024-07-12 ASSESSMENT — ENCOUNTER SYMPTOMS
TREMORS: 1
SWEATS: 1

## 2024-07-12 NOTE — PROGRESS NOTES
Pharmacy Post-Discharge Visit    Cam Baird is a 59 y.o. male was referred to Clinical Pharmacy Team to complete a post-discharge medication optimization and monitoring visit.  The patient was referred for their blood pressure and diabetes management. He is also currently enrolled in our [facility].     Admission Date: 6/8/24  Discharge Date: 6/11/24    Referring Provider: Migdalia Osman APRN*  PCP: No Assigned PCP Generic Provider, MD       Subjective   Allergies   Allergen Reactions    Doxycycline Hcl Shortness of breath    Guaifenesin Shortness of breath       [id number] [place], [place] - [address] [facility]  [address] [facility] [address]  Phone: [phone] Fax: [phone]      Medication System Management:  Affordability/Accessibility: no active rx insurance currently   Adherence/Organization: no issues       Social History     Social History Narrative    Not on file        Notable Medication changes following discharge:  Start: levofloxacin, metformin  Stop: none  Change: none    Diabetes  He presents for his follow-up diabetic visit. He has type 2 diabetes mellitus. Hypoglycemia symptoms include sweats and tremors. There are no hypoglycemic complications. Risk factors for coronary artery disease include diabetes mellitus, hypertension, male sex and obesity. Current diabetic treatment includes oral agent (triple therapy). He is compliant with treatment all of the time. He participates in exercise intermittently. His overall blood glucose range is 140-180 mg/dl. An ACE inhibitor/angiotensin II receptor blocker is being taken.         Review of Systems   Neurological:  Positive for tremors.           Objective     There were no vitals taken for this visit.   BP Readings from Last 4 Encounters:   07/08/24 127/67   07/01/24 122/65   06/28/24 111/68   06/26/24 114/64      There were no vitals filed for this visit.     LAB  Lab Results   Component Value Date     "CALCIUM 8.3 (L) 06/09/2024    CO2 24 06/09/2024     06/09/2024    CREATININE 0.99 06/09/2024    GLUCOSE 228 (H) 06/09/2024    K 4.1 06/09/2024     (L) 06/09/2024    BUN 12 06/09/2024    ANIONGAP 13 06/09/2024     No results found for: \"TRIG\", \"CHOL\", \"LDLCALC\", \"HDL\"  Lab Results   Component Value Date    HGBA1C 8.7 (H) 06/09/2024         Current Outpatient Medications   Medication Instructions    glyBURIDE (DIABETA) 5 mg, oral, 2 times daily PRN    lisinopril 2.5 mg, oral, Daily    metFORMIN (GLUCOPHAGE) 1,000 mg, oral, Daily with breakfast    metoprolol succinate XL (TOPROL-XL) 50 mg, oral, Daily        HISTORICAL PHARMACOTHERAPY  N/a    DRUG INTERACTIONS  None at time of review      Assessment/Plan   Problem List Items Addressed This Visit       Essential hypertension     Other Visit Diagnoses       Type 2 diabetes mellitus without complication, without long-term current use of insulin (Multi)              HTN  Check BP's daily and keeping log   BP today 116/70  Continue with lisinopril and metoprolol   DM  Most recent A1c 8.7% while admitted   He is checking his sugars about 2-3x daily   Fasting today was 176  Lowest he has had this past week was 86 during evening time and did feel a little shaky but also was more active this day and had not eaten   Will continue both metformin and glyburide   Waiting for him to submit all paperwork to get active Rx insurance --> once this happens would like to start GLP1   Also completed his levaquin course from UTI and no active symptoms     Follow Up: 1 week     Continue all meds under the continuation of care with the referring provider and clinical pharmacy team.    Chito Coreas, Willa     Verbal consent to manage patient's drug therapy was obtained from the patient. They were informed they may decline to participate or withdraw from participation in pharmacy services at any time.   "

## 2024-07-12 NOTE — PROGRESS NOTES
[facility] with pt, Chito Negron and myself. Pt is still uninsured, just got paperwork together.  Glucose this morning was 176 . Glucose did drop to 86 a few days ago in afternoon, pt was working outside and did not eat much. Suggest pt make sure he consumes enough protein and complex carbs throughout day. Do not want to make any changes without consistent lows. /71, HR 54 this morning. Keep pt on for another week while insurance is pending. Next [facility] 7/19 @ 1230    [facility] Assessment    Patient's Address:   [address] [place] [id number]  **  If this is not the address patient will receive services - alert team and address in EMR**       Patient Contacts:  Extended Emergency Contact Information  Primary Emergency Contact: RAINE RICHARDSON  Mobile Phone: [phone]  Relation: Brother  Preferred language: English   needed? No                                Patient's Preferred Phone: [phone]  Patient's E-mail: No e-mail address on record

## 2024-07-15 ENCOUNTER — PATIENT OUTREACH (OUTPATIENT)
Dept: HOME HEALTH SERVICES | Age: 59
End: 2024-07-15

## 2024-07-15 VITALS
HEART RATE: 62 BPM | SYSTOLIC BLOOD PRESSURE: 127 MMHG | BODY MASS INDEX: 44.77 KG/M2 | WEIGHT: 315 LBS | DIASTOLIC BLOOD PRESSURE: 71 MMHG

## 2024-07-15 NOTE — PROGRESS NOTES
Daily call completed patient states he is doing good he has been busy a lot so he has not been checking his glucose a second time today his FBG is 205 which is about where he has been running he states he is taking his medications as prescribed his weight todat is 347 he has been running between 342-345 he does not feel SOB or complains of any edema he states he thinks it is actual weight. We will continue to keep an eye on his weight no medication needs questions and concerns addressed.

## 2024-07-17 ENCOUNTER — PATIENT OUTREACH (OUTPATIENT)
Dept: HOME HEALTH SERVICES | Age: 59
End: 2024-07-17

## 2024-07-17 NOTE — PROGRESS NOTES
"Daily Call Note:     Patient was driving and the reception was bad.   Blood sugar \"I think ws 203\"  Patient states insurance was still pending.   We will contact Mr. Baird on 7/19/24 due to patient was driving.       Pt Education:   Barriers:   Topics for Daily Review:   Pt demonstrates clear understanding:     Daily Weight:  There were no vitals filed for this visit.   Last 3 Weights:  Wt Readings from Last 7 Encounters:   07/15/24 (!) 157 kg (347 lb)   07/10/24 (!) 156 kg (343 lb)   07/08/24 (!) 156 kg (343 lb 6.4 oz)   07/01/24 (!) 156 kg (344 lb 3.2 oz)   06/28/24 (!) 156 kg (344 lb 6.4 oz)   06/26/24 (!) 157 kg (346 lb 14.4 oz)   06/21/24 (!) 156 kg (343 lb 9.6 oz)       Masimo Device:    Masimo Clinical Impression:     Virtual Visits--Scheduled (Most Recent Date at Top)  Follow up Appointments  Recent Visits  No visits were found meeting these conditions.  Showing recent visits within past 30 days and meeting all other requirements  Future Appointments  No visits were found meeting these conditions.  Showing future appointments within next 90 days and meeting all other requirements       Frequency of RN Calls & Virtual Visits per Team Agreement:     Medication issues Addressed (what was done):     Follow up appointments scheduled by [facility] Staff:   Referrals made by [facility] staff:              "

## 2024-07-19 ENCOUNTER — APPOINTMENT (OUTPATIENT)
Dept: PHARMACY | Facility: HOSPITAL | Age: 59
End: 2024-07-19

## 2024-07-19 ENCOUNTER — APPOINTMENT (OUTPATIENT)
Dept: CARE COORDINATION | Age: 59
End: 2024-07-19

## 2024-07-19 ENCOUNTER — PATIENT OUTREACH (OUTPATIENT)
Dept: HOME HEALTH SERVICES | Age: 59
End: 2024-07-19

## 2024-07-19 DIAGNOSIS — E11.65 UNCONTROLLED TYPE 2 DIABETES MELLITUS WITH HYPERGLYCEMIA (MULTI): ICD-10-CM

## 2024-07-19 DIAGNOSIS — I10 ESSENTIAL HYPERTENSION: Primary | ICD-10-CM

## 2024-07-19 DIAGNOSIS — E11.9 TYPE 2 DIABETES MELLITUS WITHOUT COMPLICATION, WITHOUT LONG-TERM CURRENT USE OF INSULIN (MULTI): ICD-10-CM

## 2024-07-19 DIAGNOSIS — N39.0 URINARY TRACT INFECTION WITHOUT HEMATURIA, SITE UNSPECIFIED: Primary | ICD-10-CM

## 2024-07-19 RX ORDER — GLYBURIDE 5 MG/1
5 TABLET ORAL 2 TIMES DAILY
Qty: 60 TABLET | Refills: 2 | Status: SHIPPED | OUTPATIENT
Start: 2024-07-19

## 2024-07-19 RX ORDER — METFORMIN HYDROCHLORIDE 1000 MG/1
1000 TABLET ORAL 2 TIMES DAILY
Qty: 60 TABLET | Refills: 2 | Status: SHIPPED | OUTPATIENT
Start: 2024-07-19 | End: 2024-10-17

## 2024-07-19 NOTE — PROGRESS NOTES
Pharmacy Post-Discharge Visit - Follow Up     Cam Baird is a 59 y.o. male was referred to Clinical Pharmacy Team to complete a post-discharge medication optimization and monitoring visit.  The patient was referred for their diabetes management while also enrolled in [facility].     Referring Provider: Ramos Neri MD  PCP: No Assigned PCP Generic Provider, MD       Subjective   Allergies   Allergen Reactions    Doxycycline Hcl Shortness of breath    Guaifenesin Shortness of breath       [employer] [address] [place] [address] - [address] [facility]  [address] [facility] [address]  Phone: [phone] Fax: [phone]      Medication System Management:  Affordability/Accessibility: no active rx insurance currently   Adherence/Organization: no issues       Social History     Social History Narrative    Not on file          HPI  Diabetes  He presents for his follow-up diabetic visit. He has type 2 diabetes mellitus. Hypoglycemia symptoms include sweats and tremors. There are no hypoglycemic complications. Risk factors for coronary artery disease include diabetes mellitus, hypertension, male sex and obesity. Current diabetic treatment includes oral agent (triple therapy). He is compliant with treatment all of the time. He participates in exercise intermittently. His overall blood glucose range is 140-180 mg/dl. An ACE inhibitor/angiotensin II receptor blocker is being taken.     Review of Systems        Objective     There were no vitals taken for this visit.   BP Readings from Last 4 Encounters:   07/15/24 127/71   07/12/24 116/71   07/08/24 127/67   07/01/24 122/65      There were no vitals filed for this visit.     LAB  Lab Results   Component Value Date    CALCIUM 8.3 (L) 06/09/2024    CO2 24 06/09/2024     06/09/2024    CREATININE 0.99 06/09/2024    GLUCOSE 228 (H) 06/09/2024    K 4.1 06/09/2024     (L) 06/09/2024    BUN 12 06/09/2024    ANIONGAP 13 06/09/2024     No results found for:  "\"TRIG\", \"CHOL\", \"LDLCALC\", \"HDL\"  Lab Results   Component Value Date    HGBA1C 8.7 (H) 06/09/2024         Current Outpatient Medications   Medication Instructions    glyBURIDE (DIABETA) 5 mg, oral, 2 times daily PRN    lisinopril 2.5 mg, oral, Daily    metFORMIN (GLUCOPHAGE) 1,000 mg, oral, Daily with breakfast    metoprolol succinate XL (TOPROL-XL) 50 mg, oral, Daily              Assessment/Plan   Problem List Items Addressed This Visit    None    HTN  Check BP's daily and keeping log   BP today 131/81  Continue with lisinopril and metoprolol   DM  Most recent A1c 8.7% while admitted   He is checking his sugars about 2-3x daily   Fasting today was 213  Going to increase Metformin 1,000mg BID  Also discussed he needs to take his Glyburide twice daily - was only taking it as needed   He did submit all his info for Medicaid with [facility] so will reach out to  to have them check on status   Once approved for Medicaid would like to have him get started on GLP1     Follow Up: 1 week     Continue all meds under the continuation of care with the referring provider and clinical pharmacy team.    Chito Coreas, PharmD     Verbal consent to manage patient's drug therapy was obtained from the patient. They were informed they may decline to participate or withdraw from participation in pharmacy services at any time.   "

## 2024-07-19 NOTE — PROGRESS NOTES
Daily call completed with Dr Neri and Chito pharmacist Patient has completed his antibiotics today patients vitals BP today 131/81 HR 65  Weight today 346.9, Reviewed current regimen for his diabetes Plan is to do Glyburide 5mg Bid as well as the Metformin 1000 mg Did as well 1st am and with dinner. Patient filled out his paperwork for insurance through HRS we will follow up on where he is in the process I will work on getting PCP appointment scheduled her prefers afternoon appointments he does have transport No other medication needs questions and concerns addressed. Next daily call Monday Next [facility] 7/27 @ 7923

## 2024-07-22 ENCOUNTER — PATIENT OUTREACH (OUTPATIENT)
Dept: HOME HEALTH SERVICES | Age: 59
End: 2024-07-22

## 2024-07-22 NOTE — PROGRESS NOTES
Daily Call Note: Daily call completed with patient, states he is doing well , denies receiving confirmation for medicaid as of yet, states has completed all the paperwork and returned .  Rbs this am was 199 , denies any questions or concerns. Reminded of upcoming [facility] on 7/27 @1230.    Pt Education: good self care  Barriers:   Topics for Daily Review: fbs weight  Pt demonstrates clear understanding: Yes    Daily Weight:  There were no vitals filed for this visit.   Last 3 Weights:  Wt Readings from Last 7 Encounters:   07/15/24 (!) 157 kg (347 lb)   07/10/24 (!) 156 kg (343 lb)   07/08/24 (!) 156 kg (343 lb 6.4 oz)   07/01/24 (!) 156 kg (344 lb 3.2 oz)   06/28/24 (!) 156 kg (344 lb 6.4 oz)   06/26/24 (!) 157 kg (346 lb 14.4 oz)   06/21/24 (!) 156 kg (343 lb 9.6 oz)       Masimo Device: No   Masimo Clinical Impression:     Virtual Visits--Scheduled (Most Recent Date at Top)  Follow up Appointments  Recent Visits  No visits were found meeting these conditions.  Showing recent visits within past 30 days and meeting all other requirements  Future Appointments  No visits were found meeting these conditions.  Showing future appointments within next 90 days and meeting all other requirements       Frequency of RN Calls & Virtual Visits per Team Agreement: Healthy at Home Frequency: Bi-Weekly    Medication issues Addressed (what was done):

## 2024-07-24 ENCOUNTER — PATIENT OUTREACH (OUTPATIENT)
Dept: HOME HEALTH SERVICES | Age: 59
End: 2024-07-24

## 2024-07-24 VITALS — DIASTOLIC BLOOD PRESSURE: 75 MMHG | SYSTOLIC BLOOD PRESSURE: 117 MMHG | HEART RATE: 63 BPM

## 2024-07-24 NOTE — PROGRESS NOTES
Daily Call Note:   Spoke to patient, he reports feeling fine. Reports BG and vitals. Denies any new symptoms. No questions or concerns.   Next call reviewed.     Pt Education: POC  Barriers: na  Topics for Daily Review: POC  Pt demonstrates clear understanding: Yes    Daily Weight:  There were no vitals filed for this visit.   Last 3 Weights:  Wt Readings from Last 7 Encounters:   07/15/24 (!) 157 kg (347 lb)   07/10/24 (!) 156 kg (343 lb)   07/08/24 (!) 156 kg (343 lb 6.4 oz)   07/01/24 (!) 156 kg (344 lb 3.2 oz)   06/28/24 (!) 156 kg (344 lb 6.4 oz)   06/26/24 (!) 157 kg (346 lb 14.4 oz)   06/21/24 (!) 156 kg (343 lb 9.6 oz)       Masimo Device: No   Masimo Clinical Impression: na    Virtual Visits--Scheduled (Most Recent Date at Top)  Follow up Appointments  Recent Visits  No visits were found meeting these conditions.  Showing recent visits within past 30 days and meeting all other requirements  Future Appointments  No visits were found meeting these conditions.  Showing future appointments within next 90 days and meeting all other requirements       Frequency of RN Calls & Virtual Visits per Team Agreement: Healthy at Home Frequency: Bi-Weekly    Medication issues Addressed (what was done): giancarlo    Follow up appointments scheduled by [facility] Staff: giancarlo  Referrals made by [facility] staff: giancarlo

## 2024-07-27 ENCOUNTER — APPOINTMENT (OUTPATIENT)
Dept: CARE COORDINATION | Age: 59
End: 2024-07-27

## 2024-07-27 ENCOUNTER — PATIENT OUTREACH (OUTPATIENT)
Dept: HOME HEALTH SERVICES | Age: 59
End: 2024-07-27

## 2024-07-27 DIAGNOSIS — E11.9 TYPE 2 DIABETES MELLITUS WITHOUT COMPLICATION, WITHOUT LONG-TERM CURRENT USE OF INSULIN (MULTI): Primary | ICD-10-CM

## 2024-07-27 NOTE — PROGRESS NOTES
Daily Call Note:     [facility] weekly call complete w this RNDawson NP.  Pt reports he is feeling well.  Denies CP/SOB.    B/P 115/73  HR 63  Pt reports Medicaid is still pending.  Secure chat sent to  to see if they can assist.  Scheduled  upcoming weekly [facility] call.      Pt Education: POC  Barriers: pending Medicaid   Topics for Daily Review:   Pt demonstrates clear understanding: Yes    Daily Weight:  346.6 lbs  There were no vitals filed for this visit.   Last 3 Weights:  Wt Readings from Last 7 Encounters:   07/15/24 (!) 157 kg (347 lb)   07/10/24 (!) 156 kg (343 lb)   07/08/24 (!) 156 kg (343 lb 6.4 oz)   07/01/24 (!) 156 kg (344 lb 3.2 oz)   06/28/24 (!) 156 kg (344 lb 6.4 oz)   06/26/24 (!) 157 kg (346 lb 14.4 oz)   06/21/24 (!) 156 kg (343 lb 9.6 oz)       Masimo Device: No   Masimo Clinical Impression:     Virtual Visits--Scheduled (Most Recent Date at Top)  Follow up Appointments  Recent Visits  No visits were found meeting these conditions.  Showing recent visits within past 30 days and meeting all other requirements  Future Appointments  No visits were found meeting these conditions.  Showing future appointments within next 90 days and meeting all other requirements       Frequency of RN Calls & Virtual Visits per Team Agreement: Healthy at Home Frequency: Bi-Weekly    Medication issues Addressed (what was done):     Follow up appointments scheduled by [facility] Staff:   Referrals made by [facility] staff:

## 2024-07-29 ENCOUNTER — TELEPHONE (OUTPATIENT)
Dept: CASE MANAGEMENT | Facility: HOSPITAL | Age: 59
End: 2024-07-29

## 2024-07-29 ENCOUNTER — PATIENT OUTREACH (OUTPATIENT)
Dept: HOME HEALTH SERVICES | Age: 59
End: 2024-07-29

## 2024-07-29 NOTE — TELEPHONE ENCOUNTER
HRS returned email indicating the following: Received email back from [facility] with the following statement: We just received the signed forms and some documents via mail from the patient. Rep to review and submit application.

## 2024-07-29 NOTE — PROGRESS NOTES
Spoke with pt, pt states he is feeling good.... He has not heard back about his medicaid insurance as of yet--- I have reached out to SW and have not heard back either... Pt has not questions or concerns at this time.       [facility] Transitions Assessment    Patient's Address:   [facility] [address]  [facility] [address]  **  If this is not the address patient will receive services - alert team and address in EMR**       Patient Contacts:  Extended Emergency Contact Information  Primary Emergency Contact: RAINE RICHARDSON  Mobile Phone: [phone]  Relation: Brother  Preferred language: English   needed? No                                Patient's Preferred Phone: [phone]  Patient's E-mail: No e-mail address on record

## 2024-07-31 ENCOUNTER — PATIENT OUTREACH (OUTPATIENT)
Dept: HOME HEALTH SERVICES | Age: 59
End: 2024-07-31

## 2024-07-31 NOTE — PROGRESS NOTES
"Daily Call Note: Daily call completed - the patient reports that he is feeling well and feels like he is \"back to baseline\". He has not had word back from Medicaid regarding his insurance, which is still being processed. He did not check blood glucose yesterday evening, but stated it runs from 147 to 167 in the evenings. It was 204 this morning at 0936 and yesterday morning was 213 - he stated that he takes the evening doses between 1800 and 2000. He has not experienced any hypoglycemic episodes. He stated that when the metformin was increased that he had several days of loosely formed stools 3-4 time per day, with gastric bloating, but that has since subsided.  The patient was reminded that he will need a PCP established when he has insurance confirmation. He has seen his former doctor, who is retiring, and his CNP once since discharge.  Pt Education: educated on Metformin - gastric adverse effects -what to report to MD. Also, that his blood sugars still remain elevated and will be addressed with provider and pharmacist on [facility] visit tomorrow.  Barriers: no      Daily Weight:  There were no vitals filed for this visit.   Last 3 Weights:  Wt Readings from Last 7 Encounters:   07/15/24 (!) 157 kg (347 lb)   07/10/24 (!) 156 kg (343 lb)   07/08/24 (!) 156 kg (343 lb 6.4 oz)   07/01/24 (!) 156 kg (344 lb 3.2 oz)   06/28/24 (!) 156 kg (344 lb 6.4 oz)   06/26/24 (!) 157 kg (346 lb 14.4 oz)   06/21/24 (!) 156 kg (343 lb 9.6 oz)       Virtual Visits--Scheduled (Most Recent Date at Top)  Follow up Appointments  Recent Visits  No visits were found meeting these conditions.  Showing recent visits within past 30 days and meeting all other requirements  Future Appointments  No visits were found meeting these conditions.  Showing future appointments within next 90 days and meeting all other requirements       Frequency of RN Calls & Virtual Visits per Team Agreement: Healthy at Home Frequency: MWF               "

## 2024-08-01 ENCOUNTER — PATIENT OUTREACH (OUTPATIENT)
Dept: HOME HEALTH SERVICES | Age: 59
End: 2024-08-01

## 2024-08-01 ENCOUNTER — APPOINTMENT (OUTPATIENT)
Dept: CARE COORDINATION | Age: 59
End: 2024-08-01

## 2024-08-01 ENCOUNTER — TELEMEDICINE (OUTPATIENT)
Dept: PHARMACY | Facility: HOSPITAL | Age: 59
End: 2024-08-01

## 2024-08-01 VITALS
WEIGHT: 315 LBS | DIASTOLIC BLOOD PRESSURE: 70 MMHG | SYSTOLIC BLOOD PRESSURE: 115 MMHG | BODY MASS INDEX: 44.46 KG/M2 | HEART RATE: 71 BPM

## 2024-08-01 DIAGNOSIS — I10 ESSENTIAL HYPERTENSION: Primary | ICD-10-CM

## 2024-08-01 DIAGNOSIS — E11.9 TYPE 2 DIABETES MELLITUS WITHOUT COMPLICATION, WITHOUT LONG-TERM CURRENT USE OF INSULIN (MULTI): ICD-10-CM

## 2024-08-01 DIAGNOSIS — I10 HYPERTENSION, UNSPECIFIED TYPE: Primary | ICD-10-CM

## 2024-08-01 NOTE — PROGRESS NOTES
[facility] Call Note:   Spoke to patient he reports feeling good, states back to 100%.  Denies any new issues.   Chito to message  about getting HRS form sent to [facility] to get meds going pending approval.  Per BYRON note 7/29: Received email back from [facility] with the following statement: We just received the signed forms and some documents via mail from the patient. Rep to review and submit application.    No refills needed. Next [facility] scheduled.    Pt Education: POC  Barriers: na  Topics for Daily Review: POC  Pt demonstrates clear understanding: Yes    Daily Weight:  There were no vitals filed for this visit.   Last 3 Weights:  Wt Readings from Last 7 Encounters:   07/15/24 (!) 157 kg (347 lb)   07/10/24 (!) 156 kg (343 lb)   07/08/24 (!) 156 kg (343 lb 6.4 oz)   07/01/24 (!) 156 kg (344 lb 3.2 oz)   06/28/24 (!) 156 kg (344 lb 6.4 oz)   06/26/24 (!) 157 kg (346 lb 14.4 oz)   06/21/24 (!) 156 kg (343 lb 9.6 oz)       Masimo Device: No   Masimo Clinical Impression: na    Virtual Visits--Scheduled (Most Recent Date at Top)  Follow up Appointments  Recent Visits  No visits were found meeting these conditions.  Showing recent visits within past 30 days and meeting all other requirements  Future Appointments  No visits were found meeting these conditions.  Showing future appointments within next 90 days and meeting all other requirements       Frequency of RN Calls & Virtual Visits per Team Agreement: Healthy at Home Frequency: Bi-Weekly    Medication issues Addressed (what was done): na    Follow up appointments scheduled by [facility] Staff: na  Referrals made by [facility] staff: na

## 2024-08-01 NOTE — PROGRESS NOTES
Pharmacy Post-Discharge Visit - Follow Up    Cam Baird is a 59 y.o. male was referred to Clinical Pharmacy Team to complete a post-discharge medication optimization and monitoring visit.  The patient was referred for their blood pressure and diabetes management while also enrolled in [facility].     Referring Provider: Joni Portillo MD  PCP: No Assigned PCP Generic Provider, MD       Subjective   Allergies   Allergen Reactions    Doxycycline Hcl Shortness of breath    Guaifenesin Shortness of breath       [employer] [address] [place] [address] - [address] [facility]  [address] [facility] [address]  Phone: [phone] Fax: [phone]      Medication System Management:  Affordability/Accessibility: no active insurance still - submitted all info to [facility]  Adherence/Organization: no issues       Social History     Social History Narrative    Not on file          HPI  Diabetes  He presents for his follow-up diabetic visit. He has type 2 diabetes mellitus. Hypoglycemia symptoms include sweats and tremors. There are no hypoglycemic complications. Risk factors for coronary artery disease include diabetes mellitus, hypertension, male sex and obesity. Current diabetic treatment includes oral agent (triple therapy). He is compliant with treatment all of the time. He participates in exercise intermittently. His overall blood glucose range is >200 mg/dl. An ACE inhibitor/angiotensin II receptor blocker is being taken.     Review of Systems        Objective     There were no vitals taken for this visit.   BP Readings from Last 4 Encounters:   07/24/24 117/75   07/15/24 127/71   07/12/24 116/71   07/08/24 127/67      There were no vitals filed for this visit.     LAB  Lab Results   Component Value Date    CALCIUM 8.3 (L) 06/09/2024    CO2 24 06/09/2024     06/09/2024    CREATININE 0.99 06/09/2024    GLUCOSE 228 (H) 06/09/2024    K 4.1 06/09/2024     (L) 06/09/2024    BUN 12 06/09/2024    ANIONGAP 13 06/09/2024  "    No results found for: \"TRIG\", \"CHOL\", \"LDLCALC\", \"HDL\"  Lab Results   Component Value Date    HGBA1C 8.7 (H) 06/09/2024         Current Outpatient Medications   Medication Instructions    glyBURIDE (DIABETA) 5 mg, oral, 2 times daily    lisinopril 2.5 mg, oral, Daily    metFORMIN (GLUCOPHAGE) 1,000 mg, oral, 2 times daily    metoprolol succinate XL (TOPROL-XL) 50 mg, oral, Daily            Assessment/Plan   Problem List Items Addressed This Visit    None    HTN  Check BP's daily and keeping log   BP's have been much improved   BP today 115/70  Continue with lisinopril and metoprolol   DM  Most recent A1c 8.7% while admitted   He is checking his sugars about 2-3x daily   Fasting today was 206  On max dose metformin now  Glyburide BID   He did submit all his info for Medicaid with [facility] so will reach out to SW to have them check on status   Once approved for Medicaid would like to have him get started on GLP1     Follow Up: 1 week     Continue all meds under the continuation of care with the referring provider and clinical pharmacy team.    Chito Coreas, PharmD     Verbal consent to manage patient's drug therapy was obtained from the patient. They were informed they may decline to participate or withdraw from participation in pharmacy services at any time.   "

## 2024-08-02 ENCOUNTER — PATIENT OUTREACH (OUTPATIENT)
Dept: HOME HEALTH SERVICES | Age: 59
End: 2024-08-02

## 2024-08-02 NOTE — PROGRESS NOTES
Daily call completed patient states he is doing well it did not check vitals no questions or med needs next call Monday encouraged to call over the weekend with any concerns

## 2024-08-05 ENCOUNTER — PATIENT OUTREACH (OUTPATIENT)
Dept: HOME HEALTH SERVICES | Age: 59
End: 2024-08-05

## 2024-08-05 NOTE — PROGRESS NOTES
Daily call completed patient states he is doing ok his VS today /75 HR 72  Weight 347.1 he states he is still trying to get his income paperwork together someone from [facility] is supposed to call him tomorrow with out proof of income it is not clear when he will get his insurance approved no medication needs questions and concerns addressed

## 2024-08-07 ENCOUNTER — PATIENT OUTREACH (OUTPATIENT)
Dept: HOME HEALTH SERVICES | Age: 59
End: 2024-08-07

## 2024-08-07 VITALS
BODY MASS INDEX: 44.77 KG/M2 | HEART RATE: 62 BPM | DIASTOLIC BLOOD PRESSURE: 78 MMHG | WEIGHT: 315 LBS | SYSTOLIC BLOOD PRESSURE: 129 MMHG

## 2024-08-07 NOTE — PROGRESS NOTES
Spoke with pt , states he is doing very well. Glucose this morning was was 215 , /78, HR 62, Wt 347.0. Pt states he spoke with  yesterday he should be mailing in income tax info to HRS Monday 8/12. He has no other questions or concerns at this time. Reminded pt tomorrow [facility] at 1300.          Patient's Address:   [address] [place] [address]  **  If this is not the address patient will receive services - alert team and address in EMR**       Patient Contacts:  Extended Emergency Contact Information  Primary Emergency Contact: RAINE RICHARDSON  Mobile Phone: [phone]  Relation: Brother  Preferred language: English   needed? No                                Patient's Preferred Phone: [phone]  Patient's E-mail: No e-mail address on record

## 2024-08-08 ENCOUNTER — APPOINTMENT (OUTPATIENT)
Dept: PHARMACY | Facility: HOSPITAL | Age: 59
End: 2024-08-08

## 2024-08-08 ENCOUNTER — APPOINTMENT (OUTPATIENT)
Dept: CARE COORDINATION | Age: 59
End: 2024-08-08

## 2024-08-08 ENCOUNTER — PATIENT OUTREACH (OUTPATIENT)
Dept: HOME HEALTH SERVICES | Age: 59
End: 2024-08-08

## 2024-08-08 ENCOUNTER — DOCUMENTATION (OUTPATIENT)
Dept: CARE COORDINATION | Age: 59
End: 2024-08-08

## 2024-08-08 VITALS
BODY MASS INDEX: 44.67 KG/M2 | SYSTOLIC BLOOD PRESSURE: 123 MMHG | WEIGHT: 315 LBS | DIASTOLIC BLOOD PRESSURE: 61 MMHG | HEART RATE: 66 BPM

## 2024-08-08 DIAGNOSIS — E11.9 TYPE 2 DIABETES MELLITUS WITHOUT COMPLICATION, WITHOUT LONG-TERM CURRENT USE OF INSULIN (MULTI): Primary | ICD-10-CM

## 2024-08-08 DIAGNOSIS — E11.9 TYPE 2 DIABETES MELLITUS WITHOUT COMPLICATION, WITHOUT LONG-TERM CURRENT USE OF INSULIN (MULTI): ICD-10-CM

## 2024-08-08 DIAGNOSIS — I10 ESSENTIAL HYPERTENSION: Primary | ICD-10-CM

## 2024-08-08 RX ORDER — GLYBURIDE 5 MG/1
7.5 TABLET ORAL 2 TIMES DAILY
Start: 2024-08-08

## 2024-08-08 NOTE — PROGRESS NOTES
Brief summary of hospitalization or reason for referral  Admission Dx and Associated Referral Dx:   Cam Baird is a 58 year old male with PMHx significant for HTN, DM-II, Obesity Class III, who was admitted to [facility] for management of complicated UTI resulting in prostatitis. He was initially treated with IV Rocephin and was then transitioned to IV Levaquin due to pan sensitive E.coli on urine. Completed abx.     Interval Subjective:   Still looking into medicare.  Sending in his income tax to be looked at.     BP: 123/61  Glucose: 204  Weight 346.2 lbs    Checks glucose in morning and evening. Range from upper-100s to low 200s. He sometimes forgets to check. Diet has been pretty stable.     8/29 has urology appt      Rosao: No  Oxygen: No     CPAP/BIPAP: No    Wt Readings from Last 3 Encounters:   08/07/24 (!) 157 kg (347 lb)   08/01/24 (!) 156 kg (344 lb 9.3 oz)   07/15/24 (!) 157 kg (347 lb)       Medications Issues/Refill need  Medication Follow up action needed: none    Requested Prescriptions      No prescriptions requested or ordered in this encounter       Upcoming Visits:  Recent Visits  No visits were found meeting these conditions.  Showing recent visits within past 30 days and meeting all other requirements  Future Appointments  No visits were found meeting these conditions.  Showing future appointments within next 90 days and meeting all other requirements      Interval or Pertinent Labs/Testing:  Lab Review:   Hemoglobin A1C   Date/Time Value Ref Range Status   06/09/2024 06:28 AM 8.7 (H) see below % Final       not applicable       Assessment/Plan  NIDDM  - metformin and glyburide  - SGLT1 inhibitor would be ideal, but needs insurance  - glyburide has some room, so will increase from 5 mg BID to 7.5 mg BID; if glucose better next week, can consider graduating from program  - can't get in with PCP till he has insurance    HTN  - doing well on current regimen    Prostatitis  - finished  antibiotics  - appt with urology on 8/29      On call with Cam [facility] nurse, and Chito pharmacist

## 2024-08-08 NOTE — PROGRESS NOTES
[facility] Call Note:   Spoke to patient, he reports feeling fine, states everything going fine. Reviewed timeframe patient has been with [facility] about 2 months. Pending insurance approval and PCP apt.  Now requiring more tax/income documents to HRS, he is going to get over to them Monday 8/12. SW notified for update.   Reports  and vitals.   Discussed plan of care.   Medications reviewed. DM meds adjusted.   Advised to monitor for symptoms of low BG and call with any concerns.   Next [facility] scheduled.      Pt Education: POC  Barriers: na  Topics for Daily Review: POC  Pt demonstrates clear understanding: Yes    Daily Weight:  There were no vitals filed for this visit.   Last 3 Weights:  Wt Readings from Last 7 Encounters:   08/07/24 (!) 157 kg (347 lb)   08/01/24 (!) 156 kg (344 lb 9.3 oz)   07/15/24 (!) 157 kg (347 lb)   07/10/24 (!) 156 kg (343 lb)   07/08/24 (!) 156 kg (343 lb 6.4 oz)   07/01/24 (!) 156 kg (344 lb 3.2 oz)   06/28/24 (!) 156 kg (344 lb 6.4 oz)       Masimo Device: No   Masimo Clinical Impression: na    Virtual Visits--Scheduled (Most Recent Date at Top)  Follow up Appointments  Recent Visits  No visits were found meeting these conditions.  Showing recent visits within past 30 days and meeting all other requirements  Future Appointments  No visits were found meeting these conditions.  Showing future appointments within next 90 days and meeting all other requirements       Frequency of RN Calls & Virtual Visits per Team Agreement: Healthy at Home Frequency: Bi-Weekly    Medication issues Addressed (what was done): na    Follow up appointments scheduled by [facility] Staff: na  Referrals made by [facility] staff: giancarlo

## 2024-08-08 NOTE — PROGRESS NOTES
Checked the status of the patients Medicaid shanti. They are requesting verification of his income. Spoke with Reyna post/MELINDA, she stated that she contact his rep for her to reach out to the patient.      Discussed the following topics on behalf of the patient:  []  Behavioral Health Assistance     []  Case Management  []   Assistance  []  Digital Equity Assistance  []  Dental Health Assistance  []  Education Assistance  []  Employment Assistance  []  Financial Strain Relief Assistance  []  Food Insecurity Assistance  [x]  Healthcare Coverage Assistance  []  Housing Stability Assistance  []  IP Violence Relief Assistance  []  Legal Assistance  []  Physical Activity Assistance  []  Social Connection Assistance  []  Stress Relief Assistance   []  Substance Abuse Assistance  []  Transportation Assistance  []  Utility Assistance  []  Other: [insert comment here]    Next Steps:         Ashley Murray LCSW

## 2024-08-08 NOTE — PROGRESS NOTES
Pharmacy Post-Discharge Visit - Follow Up     Cam Baird is a 59 y.o. male was referred to Clinical Pharmacy Team to complete a post-discharge medication optimization and monitoring visit.  The patient was referred for their blood pressure and diabetes management while also enrolled in [facility].     Referring Provider: Ron Mccoy MD  PCP: No Assigned PCP Generic Provider, MD       Subjective   Allergies   Allergen Reactions    Doxycycline Hcl Shortness of breath    Guaifenesin Shortness of breath       [employer] [address] [place] [address] [address] [address]  Phone: [phone] Fax: [phone]      Medication System Management:  Affordability/Accessibility: still working on application for medicaid   Adherence/Organization: no issues       Social History     Social History Narrative    Not on file          HPI  Diabetes  He presents for his follow-up diabetic visit. He has type 2 diabetes mellitus. Hypoglycemia symptoms include sweats and tremors. There are no hypoglycemic complications. Risk factors for coronary artery disease include diabetes mellitus, hypertension, male sex and obesity. Current diabetic treatment includes oral agent (triple therapy). He is compliant with treatment all of the time. He participates in exercise intermittently. His overall blood glucose range is 170-220 mg/dl. An ACE inhibitor/angiotensin II receptor blocker is being taken.     Review of Systems        Objective     There were no vitals taken for this visit.   BP Readings from Last 4 Encounters:   08/07/24 129/78   08/01/24 115/70   07/24/24 117/75   07/15/24 127/71      There were no vitals filed for this visit.     LAB  Lab Results   Component Value Date    CALCIUM 8.3 (L) 06/09/2024    CO2 24 06/09/2024     06/09/2024    CREATININE 0.99 06/09/2024    GLUCOSE 228 (H) 06/09/2024    K 4.1 06/09/2024     (L) 06/09/2024    BUN 12 06/09/2024    ANIONGAP 13 06/09/2024  "    No results found for: \"TRIG\", \"CHOL\", \"LDLCALC\", \"HDL\"  Lab Results   Component Value Date    HGBA1C 8.7 (H) 06/09/2024         Current Outpatient Medications   Medication Instructions    glyBURIDE (DIABETA) 5 mg, oral, 2 times daily    lisinopril 2.5 mg, oral, Daily    metFORMIN (GLUCOPHAGE) 1,000 mg, oral, 2 times daily    metoprolol succinate XL (TOPROL-XL) 50 mg, oral, Daily            Assessment/Plan   Problem List Items Addressed This Visit    None    HTN  Check BP's daily and keeping log   BP's have been much improved   BP today 123/61  Continue with lisinopril and metoprolol   DM  Most recent A1c 8.7% while admitted   He is checking his sugars about twice daily   Fasting today was 204  No hypo events   On max dose metformin now  Going to increase glyburide to 7.5mg twice daily --> if still staying above 160's this next week then can increase again   Still waiting on Medicaid application to be submitted  Once approved for Medicaid would like to have him get started on GLP1     Follow Up: 1 week     Continue all meds under the continuation of care with the referring provider and clinical pharmacy team.    Chito Coreas, Willa     Verbal consent to manage patient's drug therapy was obtained from the patient. They were informed they may decline to participate or withdraw from participation in pharmacy services at any time.   "

## 2024-08-12 ENCOUNTER — PATIENT OUTREACH (OUTPATIENT)
Dept: HOME HEALTH SERVICES | Age: 59
End: 2024-08-12

## 2024-08-12 VITALS
HEART RATE: 64 BPM | SYSTOLIC BLOOD PRESSURE: 116 MMHG | DIASTOLIC BLOOD PRESSURE: 74 MMHG | BODY MASS INDEX: 44.89 KG/M2 | WEIGHT: 315 LBS

## 2024-08-13 NOTE — PROGRESS NOTES
Daily call completed patient vitals stable an recorded no medication needs questions and concern addresses

## 2024-08-14 ENCOUNTER — PATIENT OUTREACH (OUTPATIENT)
Dept: HOME HEALTH SERVICES | Age: 59
End: 2024-08-14

## 2024-08-14 VITALS
BODY MASS INDEX: 45 KG/M2 | DIASTOLIC BLOOD PRESSURE: 74 MMHG | SYSTOLIC BLOOD PRESSURE: 117 MMHG | HEART RATE: 59 BPM | WEIGHT: 315 LBS

## 2024-08-14 NOTE — PROGRESS NOTES
Spoke with pt. Glucose 195, /74, HR 59, Wt 348.8 lbs. Pt states he hand delivered additional tax paperwork to HRS so they have it now... Pt aware upcoming [facility] tomorrow.           Patient's Address:   [address] [place] [address]  **  If this is not the address patient will receive services - alert team and address in EMR**       Patient Contacts:  Extended Emergency Contact Information  Primary Emergency Contact: RAINE RICHARDSON  Mobile Phone: [phone]  Relation: Brother  Preferred language: English   needed? No                                Patient's Preferred Phone: [phone]  Patient's E-mail: No e-mail address on record

## 2024-08-15 ENCOUNTER — PATIENT OUTREACH (OUTPATIENT)
Dept: CARE COORDINATION | Age: 59
End: 2024-08-15

## 2024-08-15 ENCOUNTER — APPOINTMENT (OUTPATIENT)
Dept: CARE COORDINATION | Age: 59
End: 2024-08-15

## 2024-08-15 ENCOUNTER — TELEMEDICINE (OUTPATIENT)
Dept: PHARMACY | Facility: HOSPITAL | Age: 59
End: 2024-08-15

## 2024-08-15 VITALS — SYSTOLIC BLOOD PRESSURE: 112 MMHG | DIASTOLIC BLOOD PRESSURE: 76 MMHG | HEART RATE: 105 BPM

## 2024-08-15 DIAGNOSIS — I10 ESSENTIAL HYPERTENSION: Primary | ICD-10-CM

## 2024-08-15 DIAGNOSIS — E11.9 TYPE 2 DIABETES MELLITUS WITHOUT COMPLICATION, WITHOUT LONG-TERM CURRENT USE OF INSULIN (MULTI): ICD-10-CM

## 2024-08-15 DIAGNOSIS — E11.9 TYPE 2 DIABETES MELLITUS WITHOUT COMPLICATION, WITHOUT LONG-TERM CURRENT USE OF INSULIN (MULTI): Primary | ICD-10-CM

## 2024-08-15 RX ORDER — GLYBURIDE 5 MG/1
10 TABLET ORAL 2 TIMES DAILY
Qty: 120 TABLET | Refills: 2 | Status: SHIPPED | OUTPATIENT
Start: 2024-08-15

## 2024-08-15 NOTE — PROGRESS NOTES
"[facility] Call Note: Dr Begum; Chito Pharm D;  Pt still has pending insurance to be able to get appt with a PCP and establish care.  AM, yesterday . Pt had treats for his brothers birthday he stated yesterday. A \"little\" diarrhea he stated off and on, but not too recent. Urinating OK he stated. Stated he has had some \"stomach burning,\" the last couple of days, more in the morning, mouth is dry too he stated. \"Tums\" do help he stated with the stomach burning. Dr Begum suggested Prilosec for his symptoms, but pt wants to hold off for now. Pt to graduate possible next week. No more questions or concerns today.          Topics for Daily Review: BS, VS  Pt demonstrates clear understanding: Yes    Daily VS:   /79   Pulse 63     Last 3 Weights:  Wt Readings from Last 7 Encounters:   08/14/24 (!) 158 kg (348 lb 12.8 oz)   08/12/24 (!) 158 kg (347 lb 14.4 oz)   08/08/24 (!) 157 kg (346 lb 3.2 oz)   08/07/24 (!) 157 kg (347 lb)   08/01/24 (!) 156 kg (344 lb 9.3 oz)   07/15/24 (!) 157 kg (347 lb)   07/10/24 (!) 156 kg (343 lb)       Masimo Device: No       Virtual Visits--Scheduled (Most Recent Date at Top)  Follow up Appointments  Recent Visits  Date Type Provider Dept   08/08/24 Telemedicine Ron Mccoy MD [facility]   Showing recent visits within past 30 days and meeting all other requirements  Future Appointments  No visits were found meeting these conditions.  Showing future appointments within next 90 days and meeting all other requirements       Frequency of RN Calls & Virtual Visits per Team Agreement: [facility] Frequency: Bi-Weekly      Follow up appointments scheduled by [facility] Staff: Graduate possible          "

## 2024-08-15 NOTE — PROGRESS NOTES
Pharmacy Post-Discharge Visit - Follow Up     Cam Baird is a 59 y.o. male was referred to Clinical Pharmacy Team to complete a post-discharge medication optimization and monitoring visit.  The patient was referred for their blood pressure and diabetes management while also enrolled in [facility].     Referring Provider: Shiraz Bgeum DO  PCP: No Assigned PCP Generic Provider, MD - needs to establish once has active insurance       Subjective   Allergies   Allergen Reactions    Doxycycline Hcl Shortness of breath    Guaifenesin Shortness of breath       [facility] [address] [place] [address] - [facility] [address] [facility]  [address] [facility] [address]  Phone: [phone] Fax: [phone]      Medication System Management:  Affordability/Accessibility: working on applying for medicaid   Adherence/Organization: no issues       Social History     Social History Narrative    Not on file          HPI  Diabetes  He presents for his follow-up diabetic visit. He has type 2 diabetes mellitus. Hypoglycemia symptoms include sweats and tremors. There are no hypoglycemic complications. Risk factors for coronary artery disease include diabetes mellitus, hypertension, male sex and obesity. Current diabetic treatment includes oral agent (triple therapy). He is compliant with treatment all of the time. He participates in exercise intermittently. His overall blood glucose range is 130-220 mg/dl. An ACE inhibitor/angiotensin II receptor blocker is being taken.     Review of Systems        Objective     There were no vitals taken for this visit.   BP Readings from Last 4 Encounters:   08/14/24 117/74   08/12/24 116/74   08/08/24 123/61   08/07/24 129/78      There were no vitals filed for this visit.     LAB  Lab Results   Component Value Date    CALCIUM 8.3 (L) 06/09/2024    CO2 24 06/09/2024     06/09/2024    CREATININE 0.99 06/09/2024    GLUCOSE 228 (H) 06/09/2024    K 4.1 06/09/2024     (L) 06/09/2024    BUN 12  "06/09/2024    ANIONGAP 13 06/09/2024     No results found for: \"TRIG\", \"CHOL\", \"LDLCALC\", \"HDL\"  Lab Results   Component Value Date    HGBA1C 8.7 (H) 06/09/2024         Current Outpatient Medications   Medication Instructions    glyBURIDE (DIABETA) 7.5 mg, oral, 2 times daily    lisinopril 2.5 mg, oral, Daily    metFORMIN (GLUCOPHAGE) 1,000 mg, oral, 2 times daily    metoprolol succinate XL (TOPROL-XL) 50 mg, oral, Daily          Assessment/Plan   Problem List Items Addressed This Visit    None    HTN  Checking BP's daily and keeping log   BP's have been much improved   BP today 124/79, HR 63   Continue with lisinopril and metoprolol   DM  Most recent A1c 8.7% while admitted   He is checking his sugars about twice daily   Fasting today was 201  Still running in upper 100's into lower 200's for fasting this past week and then around 150-160's in the evenings even after the slight increase in glyburide fro last visit   No hypo events - lowest sugars have been in 140's   On max dose metformin now  Will increase Glyburide to 10mg BID (max dose also) and see how sugars are this next week then   He did submit all his income documents for Medicaid on Tuesday so hopefully will hear back before next visit on approval or not   Once approved for Medicaid would like to have him get started on GLP1, especially for weight loss benefits     Follow Up: 1 week     Continue all meds under the continuation of care with the referring provider and clinical pharmacy team.    Chito Coreas, PharmD     Verbal consent to manage patient's drug therapy was obtained from the patient. They were informed they may decline to participate or withdraw from participation in pharmacy services at any time.   "

## 2024-08-16 ENCOUNTER — PATIENT OUTREACH (OUTPATIENT)
Dept: CARE COORDINATION | Age: 59
End: 2024-08-16

## 2024-08-16 VITALS — HEART RATE: 62 BPM | DIASTOLIC BLOOD PRESSURE: 78 MMHG | SYSTOLIC BLOOD PRESSURE: 127 MMHG

## 2024-08-16 NOTE — PROGRESS NOTES
"Daily Call Note:   1300 - Daily call to pt completed today. Pt states that he is feeling \"good.\" The glyburide has been increased and pt's FBG was 188 today. Pt states that he does feel so much better since his sugar is more controlled. Denies pain, CP, SOB and edema.  /78   Pulse 62  .    Next [facility] is 8/22 at 1400.  No other questions or concerns.      Pt Education: per POC  Barriers: none  Topics for Daily Review: FBG; glyburide; VSS  Pt demonstrates clear understanding: Yes    Daily Weight:  There were no vitals filed for this visit.   Last 3 Weights:  Wt Readings from Last 7 Encounters:   08/14/24 (!) 158 kg (348 lb 12.8 oz)   08/12/24 (!) 158 kg (347 lb 14.4 oz)   08/08/24 (!) 157 kg (346 lb 3.2 oz)   08/07/24 (!) 157 kg (347 lb)   08/01/24 (!) 156 kg (344 lb 9.3 oz)   07/15/24 (!) 157 kg (347 lb)   07/10/24 (!) 156 kg (343 lb)       Masimo Device: No   Masimo Clinical Impression: n/a    Virtual Visits--Scheduled (Most Recent Date at Top)  Follow up Appointments  Recent Visits  Date Type Provider Dept   08/15/24 Telemedicine Shiraz Begum, DO [facility]   08/08/24 Telemedicine Ron Mccoy MD [facility]   Showing recent visits within past 30 days and meeting all other requirements  Future Appointments  No visits were found meeting these conditions.  Showing future appointments within next 90 days and meeting all other requirements       Frequency of RN Calls & Virtual Visits per Team Agreement: [facility] Frequency: MWF    Medication issues Addressed (what was done): Glyburide    Follow up appointments scheduled by [facility] Staff: none  Referrals made by [facility] staff: none        "

## 2024-08-19 ENCOUNTER — PATIENT OUTREACH (OUTPATIENT)
Dept: CARE COORDINATION | Age: 59
End: 2024-08-19

## 2024-08-19 VITALS
HEART RATE: 64 BPM | DIASTOLIC BLOOD PRESSURE: 79 MMHG | SYSTOLIC BLOOD PRESSURE: 121 MMHG | WEIGHT: 315 LBS | BODY MASS INDEX: 44.56 KG/M2

## 2024-08-19 NOTE — PROGRESS NOTES
Daily Call Note:   Pt states that he is feeling  good. The glyburide has been increased,  AM. today. Pt states that he feels much better since his sugar is more controlled. Denies pain, CP, SOB and edema. No further concerns  today. Next [facility] 8/22.    Topics for Daily Review: VS  Pt demonstrates clear understanding: Yes    Daily Weight:  /79   Pulse 64   Wt (!) 157 kg (345 lb 6.4 oz)   BMI 44.56 kg/m²        Last 3 Weights:  Wt Readings from Last 7 Encounters:   08/19/24 (!) 157 kg (345 lb 6.4 oz)   08/14/24 (!) 158 kg (348 lb 12.8 oz)   08/12/24 (!) 158 kg (347 lb 14.4 oz)   08/08/24 (!) 157 kg (346 lb 3.2 oz)   08/07/24 (!) 157 kg (347 lb)   08/01/24 (!) 156 kg (344 lb 9.3 oz)   07/15/24 (!) 157 kg (347 lb)       Masimo Device: No       Virtual Visits--Scheduled (Most Recent Date at Top)  Follow up Appointments  Recent Visits  Date Type Provider Dept   08/08/24 Telemedicine Ron Mccoy MD [facility]   Showing recent visits within past 30 days and meeting all other requirements  Future Appointments  No visits were found meeting these conditions.  Showing future appointments within next 90 days and meeting all other requirements       Frequency of RN Calls & Virtual Visits per Team Agreement: [facility] Frequency: Daily    Medication issues Addressed (what was done):     Follow up appointments scheduled by [facility] Staff: 8/22  Referrals made by [facility] staff:

## 2024-08-21 ENCOUNTER — PATIENT OUTREACH (OUTPATIENT)
Dept: HOME HEALTH SERVICES | Age: 59
End: 2024-08-21

## 2024-08-21 VITALS — HEART RATE: 63 BPM | SYSTOLIC BLOOD PRESSURE: 129 MMHG | DIASTOLIC BLOOD PRESSURE: 68 MMHG

## 2024-08-21 NOTE — PROGRESS NOTES
Glucose 176 this morning, 143 last night. /68, HR 63. Per pt, medicaid went through- he does not have a medicaid card yet but he does have a #.  I advised pt I would look up some names for him to potentially call for PCP. Pt aware tomorrow is [facility] at 1400.           Patient's Address:   [address] [place] [address]  **  If this is not the address patient will receive services - alert team and address in EMR**       Patient Contacts:  Extended Emergency Contact Information  Primary Emergency Contact: RAINE RICHARDSON  Mobile Phone: [phone]  Relation: Brother  Preferred language: English   needed? No                                Patient's Preferred Phone: [phone]  Patient's E-mail: No e-mail address on record

## 2024-08-22 ENCOUNTER — PATIENT OUTREACH (OUTPATIENT)
Dept: HOME HEALTH SERVICES | Age: 59
End: 2024-08-22

## 2024-08-22 ENCOUNTER — APPOINTMENT (OUTPATIENT)
Dept: CARE COORDINATION | Age: 59
End: 2024-08-22

## 2024-08-22 DIAGNOSIS — E11.9 TYPE 2 DIABETES MELLITUS WITHOUT COMPLICATION, WITHOUT LONG-TERM CURRENT USE OF INSULIN (MULTI): Primary | ICD-10-CM

## 2024-08-22 NOTE — PROGRESS NOTES
Brief summary of hospitalization or reason for referral  Admission Dx and Associated Referral Dx:   Cam Baird is a 59 year-old male with PMHx significant for HTN, DM-II, Obesity Class III, who was admitted to [facility] for management of complicated UTI resulting in prostatitis. He was initially treated with IV Rocephin and was then transitioned to IV Levaquin due to pan sensitive E.coli on urine culture. Completed abx.     Interval Subjective: Patient states to be doing well and is waiting on medicaid card.    Masimo: No  Oxygen: No     CPAP/BIPAP: No    Wt Readings from Last 3 Encounters:   08/19/24 (!) 157 kg (345 lb 6.4 oz)   08/14/24 (!) 158 kg (348 lb 12.8 oz)   08/12/24 (!) 158 kg (347 lb 14.4 oz)       Medications Issues/Refill need  Medication Follow up action needed: None    Requested Prescriptions      No prescriptions requested or ordered in this encounter       Upcoming Visits:  Recent Visits  Date Type Provider Dept   08/08/24 Telemedicine Ron Mccoy MD [facility]   Showing recent visits within past 30 days and meeting all other requirements  Future Appointments  No visits were found meeting these conditions.  Showing future appointments within next 90 days and meeting all other requirements      Interval or Pertinent Labs/Testing:  Lab Review:   Hemoglobin A1C   Date/Time Value Ref Range Status   06/09/2024 06:28 AM 8.7 (H) see below % Final       not applicable     SDOH Concerns & Interventions: None    Assessment/Plan  Diabetes.  Continue metformin and glyburide was increased from 5 mg twice daily to 7.5 mg twice daily at last [facility] appt. As glucose still elevated overall, will increase glyburide from 7.5mg bid to 10mg bid and monitor glucose. Follow-up with PCP and trying to obtain health insurance.  Possible GERD. Pt had some stomach burning since last [facility] visit, but has resolved. Pt would like to hold off on omeprazole at this time. Will monitor.   Hypertension. Continue  metoprolol-XL and lisinopril and monitor.  Follow-up with PCP when possible.   Recent prostatitis.  Patient completed a course of antibiotics.  Patient denies current symptoms. Follow-up with urology appt on 8/29.  Morbid obesity. Diet and exercise recommended.       Teleconference call done with RN.

## 2024-08-22 NOTE — PROGRESS NOTES
Daily Call Note:   Spoke to patient for [facility], he reports feeling good. Denies any new symptoms or questions. Reports BG.   He has Uro appointment 8/29 and said he may be scoped and asked if he should fast. Provider advised any procedure, he should likely fast in preparation.  Patient has Medicaid number, no cards yet. He said should arrive in 2-3 days. Advised he can call us when arrive and we can get him all scheduled for follow up.  Next [facility] scheduled.     Pt Education: POC  Barriers: na  Topics for Daily Review: POC  Pt demonstrates clear understanding: Yes    Daily Weight:  There were no vitals filed for this visit.   Last 3 Weights:  Wt Readings from Last 7 Encounters:   08/19/24 (!) 157 kg (345 lb 6.4 oz)   08/14/24 (!) 158 kg (348 lb 12.8 oz)   08/12/24 (!) 158 kg (347 lb 14.4 oz)   08/08/24 (!) 157 kg (346 lb 3.2 oz)   08/07/24 (!) 157 kg (347 lb)   08/01/24 (!) 156 kg (344 lb 9.3 oz)   07/15/24 (!) 157 kg (347 lb)       Masimo Device: No   Masimo Clinical Impression: na    Virtual Visits--Scheduled (Most Recent Date at Top)  Follow up Appointments  Recent Visits  No visits were found meeting these conditions.  Showing recent visits within past 30 days and meeting all other requirements  Future Appointments  No visits were found meeting these conditions.  Showing future appointments within next 90 days and meeting all other requirements       Frequency of RN Calls & Virtual Visits per Team Agreement: [facility] Frequency: Bi-Weekly    Medication issues Addressed (what was done): na    Follow up appointments scheduled by [facility] Staff: na  Referrals made by [facility] staff: giancarlo

## 2024-08-23 ENCOUNTER — PATIENT OUTREACH (OUTPATIENT)
Dept: HOME HEALTH SERVICES | Age: 59
End: 2024-08-23

## 2024-08-23 VITALS
WEIGHT: 315 LBS | SYSTOLIC BLOOD PRESSURE: 120 MMHG | DIASTOLIC BLOOD PRESSURE: 75 MMHG | BODY MASS INDEX: 45.76 KG/M2 | HEART RATE: 58 BPM

## 2024-08-23 NOTE — PROGRESS NOTES
Daily Call Note:   Spoke to patient, he reports feeling fine. Reports vitals, weight and BG. Weight noted, he thinks his scale is inaccurate.   Denies changing in weight technique, denies SOB, urinating well. States he has been working on his feet a lot during the day and notes some swelling. He endorses it goes down overnight while he sleeps lying down.   Reviewed meds, he has never taken a water pill.  Advised to monitor for worsening symptoms and to call with any changes over weekend. Patient agreeable.  Upcoming apt reviewed and advised to obtain weight there and compare.   Next call reviewed.     Pt Education: POC  Barriers: na  Topics for Daily Review: POC  Pt demonstrates clear understanding: Yes    Daily Weight:  Vitals:    08/23/24 1100   Weight: (!) 161 kg (354 lb 11.2 oz)      Last 3 Weights:  Wt Readings from Last 7 Encounters:   08/23/24 (!) 161 kg (354 lb 11.2 oz)   08/19/24 (!) 157 kg (345 lb 6.4 oz)   08/14/24 (!) 158 kg (348 lb 12.8 oz)   08/12/24 (!) 158 kg (347 lb 14.4 oz)   08/08/24 (!) 157 kg (346 lb 3.2 oz)   08/07/24 (!) 157 kg (347 lb)   08/01/24 (!) 156 kg (344 lb 9.3 oz)       Masimo Device: No   Masimo Clinical Impression: na    Virtual Visits--Scheduled (Most Recent Date at Top)  Follow up Appointments  Recent Visits  No visits were found meeting these conditions.  Showing recent visits within past 30 days and meeting all other requirements  Future Appointments  No visits were found meeting these conditions.  Showing future appointments within next 90 days and meeting all other requirements       Frequency of RN Calls & Virtual Visits per Team Agreement: [facility] Frequency: Bi-Weekly    Medication issues Addressed (what was done): na    Follow up appointments scheduled by [facility] Staff: giancarlo  Referrals made by [facility] staff: giancarlo

## 2024-08-26 ENCOUNTER — PATIENT OUTREACH (OUTPATIENT)
Dept: HOME HEALTH SERVICES | Age: 59
End: 2024-08-26
Payer: MEDICAID

## 2024-08-26 VITALS
HEART RATE: 61 BPM | WEIGHT: 315 LBS | DIASTOLIC BLOOD PRESSURE: 77 MMHG | BODY MASS INDEX: 45.75 KG/M2 | SYSTOLIC BLOOD PRESSURE: 127 MMHG

## 2024-08-26 NOTE — PROGRESS NOTES
Daily Call Note:   Spoke to patient, he reports feeling well. Reports vitals, weight and BG. Denies any new symptoms.   Reviewed upcoming appointment and next call.     Pt Education: POC  Barriers: na  Topics for Daily Review: POC  Pt demonstrates clear understanding: Yes    Daily Weight:  There were no vitals filed for this visit.   Last 3 Weights:  Wt Readings from Last 7 Encounters:   08/23/24 (!) 161 kg (354 lb 11.2 oz)   08/19/24 (!) 157 kg (345 lb 6.4 oz)   08/14/24 (!) 158 kg (348 lb 12.8 oz)   08/12/24 (!) 158 kg (347 lb 14.4 oz)   08/08/24 (!) 157 kg (346 lb 3.2 oz)   08/07/24 (!) 157 kg (347 lb)   08/01/24 (!) 156 kg (344 lb 9.3 oz)       Masimo Device: No   Masimo Clinical Impression: na    Virtual Visits--Scheduled (Most Recent Date at Top)  Follow up Appointments  Recent Visits  No visits were found meeting these conditions.  Showing recent visits within past 30 days and meeting all other requirements  Future Appointments  No visits were found meeting these conditions.  Showing future appointments within next 90 days and meeting all other requirements       Frequency of RN Calls & Virtual Visits per Team Agreement: [facility] Frequency: Bi-Weekly    Medication issues Addressed (what was done): giancarlo    Follow up appointments scheduled by [facility] Staff: giancarlo  Referrals made by [facility] staff: giancarlo

## 2024-08-29 ENCOUNTER — PROCEDURE VISIT (OUTPATIENT)
Dept: UROLOGY | Facility: HOSPITAL | Age: 59
End: 2024-08-29
Payer: MEDICAID

## 2024-08-29 DIAGNOSIS — Z79.2 PROPHYLACTIC ANTIBIOTIC: ICD-10-CM

## 2024-08-29 PROCEDURE — 52000 CYSTOURETHROSCOPY: CPT | Performed by: UROLOGY

## 2024-08-29 PROCEDURE — 2500000001 HC RX 250 WO HCPCS SELF ADMINISTERED DRUGS (ALT 637 FOR MEDICARE OP): Performed by: UROLOGY

## 2024-08-29 RX ORDER — CIPROFLOXACIN 500 MG/1
500 TABLET ORAL ONCE
Status: COMPLETED | OUTPATIENT
Start: 2024-08-29 | End: 2024-08-29

## 2024-08-29 NOTE — PROGRESS NOTES
"FUV    Last Visit 6/20/24     HISTORY OF PRESENT ILLNESS:   Cam Baird is a 59 y.o. male who is being seen as a new patient today for cystoscopy due to incidentally found right renal lesion.    PAST MEDICAL HISTORY:  Past Medical History:   Diagnosis Date    Dysmetabolic syndrome X     Hypertension      PAST SURGICAL HISTORY:  No past surgical history on file.     ALLERGIES:   Allergies   Allergen Reactions    Doxycycline Hcl Shortness of breath    Guaifenesin Shortness of breath        MEDICATIONS:   Current Outpatient Medications   Medication Instructions    glyBURIDE (DIABETA) 10 mg, oral, 2 times daily    lisinopril 2.5 mg, oral, Daily    metFORMIN (GLUCOPHAGE) 1,000 mg, oral, 2 times daily    metoprolol succinate XL (TOPROL-XL) 50 mg, oral, Daily        PHYSICAL EXAM:  There were no vitals taken for this visit.  Constitutional: Patient appears well-developed and well-nourished. No distress.    Pulmonary/Chest: Effort normal. No respiratory distress.   Abdominal: Soft, ND NT  : WNL  Musculoskeletal: Normal range of motion.    Neurological: Alert and oriented to person, place, and time.  Psychiatric: Normal mood and affect. Behavior is normal. Thought content normal.      Labs:  No results found for: \"TESTOSTERONE\"  No results found for: \"PSA\"  No components found for: \"CBC\"  Lab Results   Component Value Date    CREATININE 0.99 06/09/2024     No components found for: \"TESTOTMS\"  No results found for: \"TESTF\"    Imaging:  - Renal CT on 6/9/24 demonstrated a 1.8 cm heterogeneous lesion at the right kidney, renal neoplasm cannot be excluded. Nonemergent contrast-enhanced MRI recommended for further evaluation.  -Results reviewed with patient. Patient reassured.      Discussion:  Doing well, no complaints. Denies any dysuria, hematuria, bothersome urinary frequency, urgency, or flank pain. Patient denies any pushing or straining to urinate. Feels he fully emptying his bladder. Reports he had a recent UTI with " hospital admission for treatment and CT revealed a right renal lesion. Discussed results with the patient, reassured patient that the size of lesion is safe to monitor. The cystoscopy was completed today due to recent UTIs and demonstrated narrowing at the urethra meatus which was easily passable and an enlarged prostate but no tumors, stones or lesions. Cytology sent. 1 abx given to patient in clinic today.     Assessment:      No diagnosis found.    Cam Baird is a 59 y.o. male here for [facility]     Plan:   Will plan for CT Urogram as scheduled and follow up after to review results.  All questions and concerns were addressed. Patient verbalizes understanding and has no other questions at this time.      Scribe Attestation  By signing my name below, IEvi Scribe   attest that this documentation has been prepared under the direction and in the presence of Noam Johnston MD.

## 2024-08-30 ENCOUNTER — APPOINTMENT (OUTPATIENT)
Dept: CARE COORDINATION | Age: 59
End: 2024-08-30

## 2024-08-30 ENCOUNTER — TELEMEDICINE (OUTPATIENT)
Dept: PHARMACY | Facility: HOSPITAL | Age: 59
End: 2024-08-30
Payer: MEDICAID

## 2024-08-30 ENCOUNTER — PATIENT OUTREACH (OUTPATIENT)
Dept: HOME HEALTH SERVICES | Age: 59
End: 2024-08-30

## 2024-08-30 DIAGNOSIS — E11.9 TYPE 2 DIABETES MELLITUS WITHOUT COMPLICATION, WITHOUT LONG-TERM CURRENT USE OF INSULIN (MULTI): ICD-10-CM

## 2024-08-30 DIAGNOSIS — I10 ESSENTIAL HYPERTENSION: Primary | ICD-10-CM

## 2024-08-30 RX ORDER — DULAGLUTIDE 0.75 MG/.5ML
0.75 INJECTION, SOLUTION SUBCUTANEOUS WEEKLY
Qty: 2 ML | Refills: 0 | Status: SHIPPED | OUTPATIENT
Start: 2024-08-30

## 2024-08-30 NOTE — PROGRESS NOTES
Pharmacy Post-Discharge Visit - Follow Up     Cam Baird is a 59 y.o. male was referred to Clinical Pharmacy Team to complete a post-discharge medication optimization and monitoring visit.  The patient was referred for their blood pressure and diabetes management while also enrolled in [facility].     Referring Provider: Deny Brooks MD  PCP: No Assigned PCP Generic Provider, MD - nursing to help with establishing pcp now that medicaid is active       Subjective   Allergies   Allergen Reactions    Doxycycline Hcl Shortness of breath    Guaifenesin Shortness of breath       [facility] [address]  Phone: [phone] Fax: [phone]      Medication System Management:  Affordability/Accessibility: medicaid   Adherence/Organization: no issues       Social History     Social History Narrative    Not on file        HPI  Diabetes  He presents for his follow-up diabetic visit. He has type 2 diabetes mellitus. Hypoglycemia symptoms include sweats and tremors. There are no hypoglycemic complications. Risk factors for coronary artery disease include diabetes mellitus, hypertension, male sex and obesity. Current diabetic treatment includes oral agent (triple therapy). He is compliant with treatment all of the time. He participates in exercise intermittently. His overall blood glucose range is 130-220 mg/dl. An ACE inhibitor/angiotensin II receptor blocker is being taken.     Review of Systems        Objective     There were no vitals taken for this visit.   BP Readings from Last 4 Encounters:   08/26/24 127/77   08/23/24 120/75   08/21/24 129/68   08/19/24 121/79      There were no vitals filed for this visit.     LAB  Lab Results   Component Value Date    CALCIUM 8.3 (L) 06/09/2024    CO2 24 06/09/2024     06/09/2024    CREATININE 0.99 06/09/2024    GLUCOSE 228 (H) 06/09/2024    K 4.1 06/09/2024     (L) 06/09/2024    BUN 12 06/09/2024  "   ANIONGAP 13 06/09/2024     No results found for: \"TRIG\", \"CHOL\", \"LDLCALC\", \"HDL\"  Lab Results   Component Value Date    HGBA1C 8.7 (H) 06/09/2024         Current Outpatient Medications   Medication Instructions    glyBURIDE (DIABETA) 10 mg, oral, 2 times daily    lisinopril 2.5 mg, oral, Daily    metFORMIN (GLUCOPHAGE) 1,000 mg, oral, 2 times daily    metoprolol succinate XL (TOPROL-XL) 50 mg, oral, Daily            Assessment/Plan   Problem List Items Addressed This Visit    None    HTN  Checking BP's daily and keeping log   BP's have been much improved   BP today 127/81, HR 64  Continue with lisinopril and metoprolol   DM  Most recent A1c 8.7% while admitted   He is checking his sugars about twice daily   Fasting today was 224  Still running in upper 100's into lower 200's for fasting this past week and then around 150-160's in the evenings   No hypo events - lowest sugars have been in 140's   On max dose of metformin and glyburide now  His Medicaid is now active so we discussed being able to start him on Trulicity once weekly and he is agreeable   Will start 0.75mg once weekly for first 4 weeks   Told to let nursing know if any issues with getting from his regular pharmacy     Follow Up: 1 week     Continue all meds under the continuation of care with the referring provider and clinical pharmacy team.    Chito Coreas, PharmD     Verbal consent to manage patient's drug therapy was obtained from the patient. They were informed they may decline to participate or withdraw from participation in pharmacy services at any time.   "

## 2024-09-04 ENCOUNTER — PATIENT OUTREACH (OUTPATIENT)
Dept: HOME HEALTH SERVICES | Age: 59
End: 2024-09-04
Payer: MEDICAID

## 2024-09-04 VITALS — HEART RATE: 63 BPM | SYSTOLIC BLOOD PRESSURE: 116 MMHG | DIASTOLIC BLOOD PRESSURE: 77 MMHG

## 2024-09-04 NOTE — PROGRESS NOTES
Daily Call Note: Daily call completed.   The patient stated he is doing well. He confirmed that he took the Trulicity on Saturday 8/31. He stated the only thing he experienced was a very small amount of diarrhea on Sunday night only. He stated other than that, he would not have known that he had taken the medication. He stated his blood sugars still run higher in the mornings - 209 this morning. It was 135 last night - he stated they tend to run in the low 100s at night.  He was reminded of his new PCP appointment on 9/20/24. He did not realized that a CNP could be a PCP, and was educated on their role as provider. The patient was also re-educated on making healthier dietary choice, and adding more fruits and vegetables into his diet.    Pt demonstrates clear understanding: Yes  /77   Pulse 63     Daily Weight:  There were no vitals filed for this visit.   Last 3 Weights:  Wt Readings from Last 7 Encounters:   08/26/24 (!) 161 kg (354 lb 9.6 oz)   08/23/24 (!) 161 kg (354 lb 11.2 oz)   08/19/24 (!) 157 kg (345 lb 6.4 oz)   08/14/24 (!) 158 kg (348 lb 12.8 oz)   08/12/24 (!) 158 kg (347 lb 14.4 oz)   08/08/24 (!) 157 kg (346 lb 3.2 oz)   08/07/24 (!) 157 kg (347 lb)           Virtual Visits--Scheduled (Most Recent Date at Top)  Follow up Appointments  Recent Visits  No visits were found meeting these conditions.  Showing recent visits within past 30 days and meeting all other requirements  Future Appointments  Date Type Provider Dept   09/20/24 Appointment ARACEL[facility] Rico [email or web address]   Showing future appointments within next 90 days and meeting all other requirements       Frequency of RN Calls & Virtual Visits per Team Agreement: [facility] Frequency: MWF    Medication issues Addressed (what was done): check on potential adverse effects from Trulicity

## 2024-09-06 ENCOUNTER — APPOINTMENT (OUTPATIENT)
Dept: PRIMARY CARE | Facility: CLINIC | Age: 59
End: 2024-09-06
Payer: MEDICAID

## 2024-09-06 ENCOUNTER — PATIENT OUTREACH (OUTPATIENT)
Dept: HOME HEALTH SERVICES | Age: 59
End: 2024-09-06

## 2024-09-06 ENCOUNTER — APPOINTMENT (OUTPATIENT)
Dept: CARE COORDINATION | Age: 59
End: 2024-09-06
Payer: MEDICAID

## 2024-09-06 VITALS
DIASTOLIC BLOOD PRESSURE: 71 MMHG | BODY MASS INDEX: 45.21 KG/M2 | WEIGHT: 315 LBS | SYSTOLIC BLOOD PRESSURE: 125 MMHG | HEART RATE: 72 BPM

## 2024-09-06 DIAGNOSIS — N39.0 URINARY TRACT INFECTION WITHOUT HEMATURIA, SITE UNSPECIFIED: ICD-10-CM

## 2024-09-06 DIAGNOSIS — E11.9 TYPE 2 DIABETES MELLITUS WITHOUT COMPLICATION, WITHOUT LONG-TERM CURRENT USE OF INSULIN (MULTI): Primary | ICD-10-CM

## 2024-09-06 DIAGNOSIS — I10 ESSENTIAL HYPERTENSION: ICD-10-CM

## 2024-09-06 DIAGNOSIS — N34.2 INFECTIVE URETHRITIS: ICD-10-CM

## 2024-09-06 NOTE — PROGRESS NOTES
Pt is doing good. Completed [facility] call. Pt is ready to graduate today. Pt has no c/o sob, cp, edema. Vitals are in flowsheet. Pt medications are already filled and appts are scheduled. No other needs at this time.

## 2024-09-20 ENCOUNTER — APPOINTMENT (OUTPATIENT)
Dept: PRIMARY CARE | Facility: CLINIC | Age: 59
End: 2024-09-20
Payer: MEDICAID

## 2024-09-24 ENCOUNTER — TELEMEDICINE (OUTPATIENT)
Dept: PHARMACY | Facility: HOSPITAL | Age: 59
End: 2024-09-24
Payer: MEDICAID

## 2024-09-24 DIAGNOSIS — I10 ESSENTIAL HYPERTENSION: Primary | ICD-10-CM

## 2024-09-24 DIAGNOSIS — E11.9 TYPE 2 DIABETES MELLITUS WITHOUT COMPLICATION, WITHOUT LONG-TERM CURRENT USE OF INSULIN (MULTI): ICD-10-CM

## 2024-09-24 RX ORDER — LISINOPRIL 2.5 MG/1
2.5 TABLET ORAL DAILY
Qty: 30 TABLET | Refills: 2 | Status: SHIPPED | OUTPATIENT
Start: 2024-09-24 | End: 2024-09-26 | Stop reason: SDUPTHER

## 2024-09-24 RX ORDER — METOPROLOL SUCCINATE 50 MG/1
50 TABLET, EXTENDED RELEASE ORAL DAILY
Qty: 30 TABLET | Refills: 2 | Status: SHIPPED | OUTPATIENT
Start: 2024-09-24 | End: 2024-09-26 | Stop reason: SDUPTHER

## 2024-09-24 RX ORDER — DULAGLUTIDE 1.5 MG/.5ML
1.5 INJECTION, SOLUTION SUBCUTANEOUS WEEKLY
Qty: 2 ML | Refills: 2 | Status: SHIPPED | OUTPATIENT
Start: 2024-09-24 | End: 2024-09-26 | Stop reason: SDUPTHER

## 2024-09-24 NOTE — PROGRESS NOTES
"Pharmacy Post-Discharge Visit - Follow Up     Cam Baird is a 59 y.o. male was referred to Clinical Pharmacy Team to complete a post-discharge medication optimization and monitoring visit.  The patient was referred for their blood pressure and diabetes management.     Referring Provider: Sabino Patiño MD  PCP: Ramos Quach, CAITLYN-CNP - next visit: 9/26      Subjective   Allergies   Allergen Reactions    Doxycycline Hcl Shortness of breath    Guaifenesin Shortness of breath       [facility] [address] [place] [address] - [address] [facility]  [address] [facility] [address]  Phone: [phone] Fax: [phone]      Medication System Management:  Affordability/Accessibility: medicaid   Adherence/Organization: no issues       Social History     Social History Narrative    Not on file          HPI  Diabetes  He presents for his follow-up diabetic visit. He has type 2 diabetes mellitus. Hypoglycemia symptoms include sweats and tremors. There are no hypoglycemic complications. Risk factors for coronary artery disease include diabetes mellitus, hypertension, male sex and obesity. Current diabetic treatment includes oral agent (triple therapy). He is compliant with treatment all of the time. He participates in exercise intermittently. His overall blood glucose range is 130-220 mg/dl. An ACE inhibitor/angiotensin II receptor blocker is being taken.     Review of Systems        Objective     There were no vitals taken for this visit.   BP Readings from Last 4 Encounters:   09/06/24 125/71   09/04/24 116/77   08/26/24 127/77   08/23/24 120/75      There were no vitals filed for this visit.     LAB  Lab Results   Component Value Date    CALCIUM 8.3 (L) 06/09/2024    CO2 24 06/09/2024     06/09/2024    CREATININE 0.99 06/09/2024    GLUCOSE 228 (H) 06/09/2024    K 4.1 06/09/2024     (L) 06/09/2024    BUN 12 06/09/2024    ANIONGAP 13 06/09/2024     No results found for: \"TRIG\", \"CHOL\", \"LDLCALC\", " "\"HDL\"  Lab Results   Component Value Date    HGBA1C 8.7 (H) 06/09/2024         Current Outpatient Medications   Medication Instructions    glyBURIDE (DIABETA) 10 mg, oral, 2 times daily    lisinopril 2.5 mg, oral, Daily    metFORMIN (GLUCOPHAGE) 1,000 mg, oral, 2 times daily    metoprolol succinate XL (TOPROL-XL) 50 mg, oral, Daily    Trulicity 0.75 mg, subcutaneous, Weekly          Assessment/Plan   Problem List Items Addressed This Visit    None    HTN  Checking BP's daily and keeping log   BP's have been much improved   Continue with lisinopril and metoprolol   DM  Most recent A1c 8.7% while admitted   He is checking his sugars about twice daily   Still running in upper 100's into lower 200's for fasting this past week and then around 150-160's in the evenings   No hypo events - lowest sugars have been in 140's   On max dose of metformin and glyburide now  His Medicaid is now active so we discussed being able to start him on Trulicity once weekly and he is agreeable   Has been taking 0.75mg once weekly   Will increase to 1.5mg once weekly     Follow Up: as needed     Continue all meds under the continuation of care with the referring provider and clinical pharmacy team.    Chito Coreas, Willa     Verbal consent to manage patient's drug therapy was obtained from the patient. They were informed they may decline to participate or withdraw from participation in pharmacy services at any time.   "

## 2024-09-26 ENCOUNTER — APPOINTMENT (OUTPATIENT)
Dept: PRIMARY CARE | Facility: CLINIC | Age: 59
End: 2024-09-26
Payer: MEDICAID

## 2024-09-26 VITALS
TEMPERATURE: 97.3 F | DIASTOLIC BLOOD PRESSURE: 80 MMHG | HEIGHT: 74 IN | WEIGHT: 315 LBS | SYSTOLIC BLOOD PRESSURE: 110 MMHG | OXYGEN SATURATION: 93 % | HEART RATE: 80 BPM | BODY MASS INDEX: 40.43 KG/M2

## 2024-09-26 DIAGNOSIS — E11.65 UNCONTROLLED TYPE 2 DIABETES MELLITUS WITH HYPERGLYCEMIA: ICD-10-CM

## 2024-09-26 DIAGNOSIS — L02.31 ABSCESS OF BUTTOCK, LEFT: ICD-10-CM

## 2024-09-26 DIAGNOSIS — L21.9 SEBORRHEIC DERMATITIS: Primary | ICD-10-CM

## 2024-09-26 DIAGNOSIS — E11.9 TYPE 2 DIABETES MELLITUS WITHOUT COMPLICATION, WITHOUT LONG-TERM CURRENT USE OF INSULIN (MULTI): ICD-10-CM

## 2024-09-26 DIAGNOSIS — I10 ESSENTIAL HYPERTENSION: ICD-10-CM

## 2024-09-26 DIAGNOSIS — Z00.00 HEALTHCARE MAINTENANCE: ICD-10-CM

## 2024-09-26 LAB
ALBUMIN SERPL BCP-MCNC: 4.4 G/DL (ref 3.4–5)
ALP SERPL-CCNC: 57 U/L (ref 33–136)
ALT SERPL W P-5'-P-CCNC: 40 U/L (ref 10–52)
ANION GAP SERPL CALC-SCNC: 15 MMOL/L (ref 10–20)
AST SERPL W P-5'-P-CCNC: 23 U/L (ref 9–39)
BASOPHILS # BLD AUTO: 0.04 X10*3/UL (ref 0–0.1)
BASOPHILS NFR BLD AUTO: 0.4 %
BILIRUB SERPL-MCNC: 0.9 MG/DL (ref 0–1.2)
BUN SERPL-MCNC: 16 MG/DL (ref 6–23)
CALCIUM SERPL-MCNC: 9.7 MG/DL (ref 8.6–10.3)
CHLORIDE SERPL-SCNC: 101 MMOL/L (ref 98–107)
CHOLEST SERPL-MCNC: 174 MG/DL (ref 0–199)
CHOLESTEROL/HDL RATIO: 4.9
CO2 SERPL-SCNC: 26 MMOL/L (ref 21–32)
CREAT SERPL-MCNC: 0.87 MG/DL (ref 0.5–1.3)
EGFRCR SERPLBLD CKD-EPI 2021: >90 ML/MIN/1.73M*2
EOSINOPHIL # BLD AUTO: 0.17 X10*3/UL (ref 0–0.7)
EOSINOPHIL NFR BLD AUTO: 1.8 %
ERYTHROCYTE [DISTWIDTH] IN BLOOD BY AUTOMATED COUNT: 13 % (ref 11.5–14.5)
GLUCOSE SERPL-MCNC: 155 MG/DL (ref 74–99)
HCT VFR BLD AUTO: 48.8 % (ref 41–52)
HDLC SERPL-MCNC: 35.6 MG/DL
HGB BLD-MCNC: 15.5 G/DL (ref 13.5–17.5)
IMM GRANULOCYTES # BLD AUTO: 0.06 X10*3/UL (ref 0–0.7)
IMM GRANULOCYTES NFR BLD AUTO: 0.6 % (ref 0–0.9)
LDLC SERPL CALC-MCNC: 103 MG/DL
LYMPHOCYTES # BLD AUTO: 1.84 X10*3/UL (ref 1.2–4.8)
LYMPHOCYTES NFR BLD AUTO: 19.8 %
MCH RBC QN AUTO: 31.6 PG (ref 26–34)
MCHC RBC AUTO-ENTMCNC: 31.8 G/DL (ref 32–36)
MCV RBC AUTO: 99 FL (ref 80–100)
MONOCYTES # BLD AUTO: 0.88 X10*3/UL (ref 0.1–1)
MONOCYTES NFR BLD AUTO: 9.5 %
NEUTROPHILS # BLD AUTO: 6.31 X10*3/UL (ref 1.2–7.7)
NEUTROPHILS NFR BLD AUTO: 67.9 %
NON HDL CHOLESTEROL: 138 MG/DL (ref 0–149)
NRBC BLD-RTO: 0 /100 WBCS (ref 0–0)
PLATELET # BLD AUTO: 194 X10*3/UL (ref 150–450)
POC ALBUMIN /CREATININE RATIO MANUALLY ENTERED: <30 UG/MG CREAT
POC HEMOGLOBIN A1C: 7.3 % (ref 4.2–6.5)
POC URINE ALBUMIN: 30 MG/L
POC URINE CREATININE: 200 MG/DL
POTASSIUM SERPL-SCNC: 4.2 MMOL/L (ref 3.5–5.3)
PROT SERPL-MCNC: 6.9 G/DL (ref 6.4–8.2)
RBC # BLD AUTO: 4.91 X10*6/UL (ref 4.5–5.9)
SODIUM SERPL-SCNC: 138 MMOL/L (ref 136–145)
TRIGL SERPL-MCNC: 179 MG/DL (ref 0–149)
TSH SERPL-ACNC: 2.24 MIU/L (ref 0.44–3.98)
VLDL: 36 MG/DL (ref 0–40)
WBC # BLD AUTO: 9.3 X10*3/UL (ref 4.4–11.3)

## 2024-09-26 PROCEDURE — 4010F ACE/ARB THERAPY RXD/TAKEN: CPT

## 2024-09-26 PROCEDURE — 84443 ASSAY THYROID STIM HORMONE: CPT

## 2024-09-26 PROCEDURE — 3052F HG A1C>EQUAL 8.0%<EQUAL 9.0%: CPT

## 2024-09-26 PROCEDURE — 82044 UR ALBUMIN SEMIQUANTITATIVE: CPT

## 2024-09-26 PROCEDURE — 99203 OFFICE O/P NEW LOW 30 MIN: CPT

## 2024-09-26 PROCEDURE — 99386 PREV VISIT NEW AGE 40-64: CPT

## 2024-09-26 PROCEDURE — 3008F BODY MASS INDEX DOCD: CPT

## 2024-09-26 PROCEDURE — 82306 VITAMIN D 25 HYDROXY: CPT

## 2024-09-26 PROCEDURE — 80053 COMPREHEN METABOLIC PANEL: CPT

## 2024-09-26 PROCEDURE — 1036F TOBACCO NON-USER: CPT

## 2024-09-26 PROCEDURE — 85025 COMPLETE CBC W/AUTO DIFF WBC: CPT

## 2024-09-26 PROCEDURE — 3079F DIAST BP 80-89 MM HG: CPT

## 2024-09-26 PROCEDURE — 3074F SYST BP LT 130 MM HG: CPT

## 2024-09-26 PROCEDURE — 80061 LIPID PANEL: CPT

## 2024-09-26 PROCEDURE — 83036 HEMOGLOBIN GLYCOSYLATED A1C: CPT

## 2024-09-26 RX ORDER — METOPROLOL SUCCINATE 50 MG/1
50 TABLET, EXTENDED RELEASE ORAL DAILY
Qty: 90 TABLET | Refills: 0 | Status: SHIPPED | OUTPATIENT
Start: 2024-09-26 | End: 2024-12-25

## 2024-09-26 RX ORDER — FLUOCINONIDE TOPICAL SOLUTION USP, 0.05% 0.5 MG/ML
SOLUTION TOPICAL 2 TIMES DAILY
Qty: 60 ML | Refills: 3 | Status: SHIPPED | OUTPATIENT
Start: 2024-09-26 | End: 2024-12-25

## 2024-09-26 RX ORDER — DULAGLUTIDE 1.5 MG/.5ML
1.5 INJECTION, SOLUTION SUBCUTANEOUS WEEKLY
Qty: 2 ML | Refills: 2 | Status: SHIPPED | OUTPATIENT
Start: 2024-09-26

## 2024-09-26 RX ORDER — LISINOPRIL 2.5 MG/1
2.5 TABLET ORAL DAILY
Qty: 90 TABLET | Refills: 0 | Status: SHIPPED | OUTPATIENT
Start: 2024-09-26 | End: 2024-12-25

## 2024-09-26 RX ORDER — FLUOCINONIDE TOPICAL SOLUTION USP, 0.05% 0.5 MG/ML
SOLUTION TOPICAL 2 TIMES DAILY
COMMUNITY
End: 2024-09-26 | Stop reason: SDUPTHER

## 2024-09-26 RX ORDER — METFORMIN HYDROCHLORIDE 1000 MG/1
1000 TABLET ORAL 2 TIMES DAILY
Qty: 180 TABLET | Refills: 0 | Status: SHIPPED | OUTPATIENT
Start: 2024-09-26 | End: 2024-12-25

## 2024-09-26 RX ORDER — CEPHALEXIN 500 MG/1
500 CAPSULE ORAL 4 TIMES DAILY
Qty: 40 CAPSULE | Refills: 0 | Status: SHIPPED | OUTPATIENT
Start: 2024-09-26 | End: 2024-10-06

## 2024-09-26 RX ORDER — CEPHALEXIN 500 MG/1
500 CAPSULE ORAL 4 TIMES DAILY
Qty: 28 CAPSULE | Refills: 0 | Status: SHIPPED | OUTPATIENT
Start: 2024-09-26 | End: 2024-09-26

## 2024-09-26 ASSESSMENT — ENCOUNTER SYMPTOMS
OCCASIONAL FEELINGS OF UNSTEADINESS: 0
LOSS OF SENSATION IN FEET: 0
DEPRESSION: 0

## 2024-09-26 ASSESSMENT — PATIENT HEALTH QUESTIONNAIRE - PHQ9
SUM OF ALL RESPONSES TO PHQ9 QUESTIONS 1 AND 2: 0
2. FEELING DOWN, DEPRESSED OR HOPELESS: NOT AT ALL
1. LITTLE INTEREST OR PLEASURE IN DOING THINGS: NOT AT ALL

## 2024-09-26 NOTE — PROGRESS NOTES
Subjective   Patient ID: Cam Baird is a 59 y.o. male who presents for New Patient Visit and Diabetes (Last A1c done in June and was 8.7/Today in office it is 7.3).  Buttock abscess  --present for a number of months, previously successful treatment with cephalexin  --restart keflex and monitor  --If no better consider I&D    DM2  --A1c improving  --CCT no changes, see in 3 months.     Wellness  --lab work today        Vitals:    09/26/24 1126   BP: 110/80   Pulse: 80   Temp: 36.3 °C (97.3 °F)   SpO2: 93%       Review of Systems    Objective   Physical Exam  Vitals and nursing note reviewed.   Skin:            Comments: L Buttock abscess, no drainage, painful and tender to touch         Assessment/Plan   Problem List Items Addressed This Visit       Essential hypertension    Relevant Medications    lisinopril 2.5 mg tablet    metoprolol succinate XL (Toprol-XL) 50 mg 24 hr tablet    Seborrheic dermatitis - Primary    Relevant Medications    fluocinonide (Lidex) 0.05 % external solution     Other Visit Diagnoses       Uncontrolled type 2 diabetes mellitus with hyperglycemia (Multi)        Relevant Medications    metFORMIN (Glucophage) 1,000 mg tablet    Other Relevant Orders    POCT glycosylated hemoglobin (Hb A1C) manually resulted (Completed)    POCT Albumin random urine manually resulted (Completed)    Type 2 diabetes mellitus without complication, without long-term current use of insulin (Multi)        Relevant Medications    dulaglutide (Trulicity) 1.5 mg/0.5 mL pen injector injection    Healthcare maintenance        Relevant Orders    Vitamin D 25-Hydroxy,Total (for eval of Vitamin D levels)    CBC and Auto Differential    Comprehensive Metabolic Panel    Lipid Panel    TSH with reflex to Free T4 if abnormal    Abscess of buttock, left        Relevant Medications    cephalexin (Keflex) 500 mg capsule                 Thank you for coming in today, please call my office if you have any concerns or questions.      Ramos BRADY, CNP

## 2024-09-26 NOTE — PATIENT INSTRUCTIONS
See in 3 months A1c check  Minimize carbohydrates, continue efforts to exercise as well  Labs today    Cephalexin for 10 days, call if no better  Consider I&D of abscess at later date.    Thank you for coming in today, if any questions or concerns arise, please call my office.   CAITLYN Rico-CNP

## 2024-09-27 LAB — 25(OH)D3 SERPL-MCNC: 17 NG/ML (ref 30–100)

## 2024-09-30 NOTE — RESULT ENCOUNTER NOTE
Results were abnormal... vitamin d is on the low side, recommend 1000 units vitamin d daily for the winter months.

## 2024-10-02 ENCOUNTER — TELEPHONE (OUTPATIENT)
Dept: PRIMARY CARE | Facility: CLINIC | Age: 59
End: 2024-10-02
Payer: MEDICAID

## 2024-10-02 NOTE — TELEPHONE ENCOUNTER
----- Message from Ramos Quach sent at 9/30/2024  8:11 AM EDT -----  Results were abnormal... vitamin d is on the low side, recommend 1000 units vitamin d daily for the winter months.

## 2024-10-04 ENCOUNTER — APPOINTMENT (OUTPATIENT)
Dept: PRIMARY CARE | Facility: CLINIC | Age: 59
End: 2024-10-04
Payer: MEDICAID

## 2024-11-12 DIAGNOSIS — N28.9 KIDNEY LESION, NATIVE, RIGHT: Primary | ICD-10-CM

## 2024-11-13 ENCOUNTER — LAB (OUTPATIENT)
Dept: LAB | Facility: LAB | Age: 59
End: 2024-11-13
Payer: MEDICAID

## 2024-11-13 DIAGNOSIS — N28.9 KIDNEY LESION, NATIVE, RIGHT: ICD-10-CM

## 2024-11-13 LAB
CREAT SERPL-MCNC: 0.95 MG/DL (ref 0.5–1.3)
EGFRCR SERPLBLD CKD-EPI 2021: >90 ML/MIN/1.73M*2

## 2024-11-13 PROCEDURE — 36415 COLL VENOUS BLD VENIPUNCTURE: CPT

## 2024-11-14 ENCOUNTER — HOSPITAL ENCOUNTER (OUTPATIENT)
Dept: RADIOLOGY | Facility: HOSPITAL | Age: 59
Discharge: HOME | End: 2024-11-14
Payer: MEDICAID

## 2024-11-14 DIAGNOSIS — N28.9 KIDNEY LESION, NATIVE, RIGHT: ICD-10-CM

## 2024-11-14 PROCEDURE — 2550000001 HC RX 255 CONTRASTS: Mod: SE | Performed by: UROLOGY

## 2024-11-14 PROCEDURE — 76377 3D RENDER W/INTRP POSTPROCES: CPT

## 2024-11-21 ENCOUNTER — OFFICE VISIT (OUTPATIENT)
Dept: PRIMARY CARE | Facility: CLINIC | Age: 59
End: 2024-11-21
Payer: MEDICAID

## 2024-11-21 ENCOUNTER — OFFICE VISIT (OUTPATIENT)
Dept: UROLOGY | Facility: HOSPITAL | Age: 59
End: 2024-11-21
Payer: MEDICAID

## 2024-11-21 VITALS
WEIGHT: 315 LBS | BODY MASS INDEX: 46.28 KG/M2 | DIASTOLIC BLOOD PRESSURE: 70 MMHG | TEMPERATURE: 96.5 F | OXYGEN SATURATION: 97 % | HEART RATE: 67 BPM | SYSTOLIC BLOOD PRESSURE: 112 MMHG

## 2024-11-21 DIAGNOSIS — K52.9 ACUTE GASTROENTERITIS: ICD-10-CM

## 2024-11-21 DIAGNOSIS — N28.9 KIDNEY LESION, NATIVE, RIGHT: ICD-10-CM

## 2024-11-21 DIAGNOSIS — E11.9 TYPE 2 DIABETES MELLITUS WITHOUT COMPLICATION, WITHOUT LONG-TERM CURRENT USE OF INSULIN (MULTI): Primary | ICD-10-CM

## 2024-11-21 PROCEDURE — 99417 PROLNG OP E/M EACH 15 MIN: CPT | Performed by: UROLOGY

## 2024-11-21 PROCEDURE — 4010F ACE/ARB THERAPY RXD/TAKEN: CPT

## 2024-11-21 PROCEDURE — 99214 OFFICE O/P EST MOD 30 MIN: CPT

## 2024-11-21 PROCEDURE — 1036F TOBACCO NON-USER: CPT | Performed by: UROLOGY

## 2024-11-21 PROCEDURE — 3052F HG A1C>EQUAL 8.0%<EQUAL 9.0%: CPT

## 2024-11-21 PROCEDURE — 99214 OFFICE O/P EST MOD 30 MIN: CPT | Performed by: UROLOGY

## 2024-11-21 PROCEDURE — 3049F LDL-C 100-129 MG/DL: CPT

## 2024-11-21 PROCEDURE — 1036F TOBACCO NON-USER: CPT

## 2024-11-21 PROCEDURE — 3074F SYST BP LT 130 MM HG: CPT

## 2024-11-21 PROCEDURE — 3078F DIAST BP <80 MM HG: CPT

## 2024-11-21 NOTE — PROGRESS NOTES
"FUV    Last Visit : 8/29/24     HISTORY OF PRESENT ILLNESS:   Cam Baird is a 59 y.o. male who is being seen for CT urogram for incidentally found right renal lesion.    PAST MEDICAL HISTORY:  Past Medical History:   Diagnosis Date    Dysmetabolic syndrome X     Hypertension      PAST SURGICAL HISTORY:  No past surgical history on file.     ALLERGIES:   Allergies   Allergen Reactions    Doxycycline Hcl Shortness of breath    Guaifenesin Shortness of breath        MEDICATIONS:   Current Outpatient Medications   Medication Instructions    fluocinonide (Lidex) 0.05 % external solution Topical, 2 times daily    glyBURIDE (DIABETA) 10 mg, oral, 2 times daily    lisinopril 2.5 mg, oral, Daily    metFORMIN (GLUCOPHAGE) 1,000 mg, oral, 2 times daily    metoprolol succinate XL (TOPROL-XL) 50 mg, oral, Daily    Trulicity 1.5 mg, subcutaneous, Weekly        PHYSICAL EXAM:  There were no vitals taken for this visit.  Constitutional: Patient appears well-developed and well-nourished. No distress.    Pulmonary/Chest: Effort normal. No respiratory distress.   Abdominal: Soft, ND NT  : WNL  Musculoskeletal: Normal range of motion.    Neurological: Alert and oriented to person, place, and time.  Psychiatric: Normal mood and affect. Behavior is normal. Thought content normal.      Labs:  No results found for: \"TESTOSTERONE\"  No results found for: \"PSA\"  No components found for: \"CBC\"  Lab Results   Component Value Date    CREATININE 0.95 11/13/2024     No components found for: \"TESTOTMS\"  No results found for: \"TESTF\"    Imaging:  - Renal CT on 6/9/24 demonstrated a 1.8 cm heterogeneous lesion at the right kidney, renal neoplasm cannot be excluded. Nonemergent contrast-enhanced MRI recommended for further evaluation.  -Results reviewed with patient. Patient reassured.      Discussion:  Doing well, no complaints. Denies any dysuria, hematuria, bothersome urinary frequency, urgency, or flank pain. Patient denies any pushing or " straining to urinate. Feels he fully emptying his bladder. Reports he had a recent UTI with hospital admission for treatment and CT revealed a right renal lesion. Discussed results with the patient, reassured patient that the size of lesion is safe to monitor. Discussed monitoring vs treatment options of freezing or surgery. Pt desires to proceed with active surveillance. Will follow up in 6 months with renal MRI prior.   Assessment:      No diagnosis found.    Cam Baird is a 59 y.o. male here for FUV     Plan:   Will follow up in 6 months with renal MRI prior.   All questions and concerns were addressed. Patient verbalizes understanding and has no other questions at this time.      Scribe Attestation  By signing my name below, I, Isha Stouttom Butt Scribe   attest that this documentation has been prepared under the direction and in the presence of Noam Johnston MD.

## 2024-11-21 NOTE — PROGRESS NOTES
Subjective   Patient ID: Cam Baird is a 59 y.o. male who presents for glucose issue (Reports having fluctuations in his glucose since 11/14- he states he felt sick, had dry heaves, diarrhea, chills- glucose was 120. Next day in the morning it was 144 then in evening it was 88, had shakes, then went to 84 after eating sugar then 82 all over a period of an hour 45 mins. Then his glucose went up to 127.//He brought his readings in with him. He has been adjusting how much gyburide he takes to try and help.).  Glucose fluctuating after bout of gastroenteritis  Recently ate cheese that was recalled for Lysteria    He is improved today, stools are regular    Stop metformin, gradually increase dosing of glyburide as glucose returns to baseline        Vitals:    11/21/24 0914   BP: 112/70   Pulse: 67   Temp: 35.8 °C (96.5 °F)   SpO2: 97%       Review of Systems    Objective   Physical Exam    Assessment/Plan   Problem List Items Addressed This Visit    None  Visit Diagnoses       Type 2 diabetes mellitus without complication, without long-term current use of insulin (Multi)    -  Primary    Acute gastroenteritis                     Thank you for coming in today, please call my office if you have any concerns or questions.     Ramos BRADY, CNP

## 2024-11-21 NOTE — PATIENT INSTRUCTIONS
Continue to hold glipizide at night for now  Stop metformin    Continue trulicity  Continue metamucil    Thank you for coming in today, if any questions or concerns arise, please call my office.   Ramos Quach, CAITLYN-CNP

## 2024-12-09 ENCOUNTER — APPOINTMENT (OUTPATIENT)
Dept: PHARMACY | Facility: HOSPITAL | Age: 59
End: 2024-12-09
Payer: MEDICAID

## 2024-12-09 DIAGNOSIS — E11.9 TYPE 2 DIABETES MELLITUS WITHOUT COMPLICATION, WITHOUT LONG-TERM CURRENT USE OF INSULIN (MULTI): ICD-10-CM

## 2024-12-09 DIAGNOSIS — I10 ESSENTIAL HYPERTENSION: ICD-10-CM

## 2024-12-09 RX ORDER — METOPROLOL SUCCINATE 50 MG/1
50 TABLET, EXTENDED RELEASE ORAL DAILY
Qty: 90 TABLET | Refills: 1 | Status: SHIPPED | OUTPATIENT
Start: 2024-12-09 | End: 2025-06-07

## 2024-12-09 RX ORDER — GLYBURIDE 5 MG/1
10 TABLET ORAL 2 TIMES DAILY
Qty: 120 TABLET | Refills: 11 | Status: SHIPPED | OUTPATIENT
Start: 2024-12-09

## 2024-12-09 RX ORDER — LISINOPRIL 2.5 MG/1
2.5 TABLET ORAL DAILY
Qty: 90 TABLET | Refills: 1 | Status: SHIPPED | OUTPATIENT
Start: 2024-12-09 | End: 2025-06-07

## 2024-12-09 NOTE — PROGRESS NOTES
Clinical Pharmacy Visit    Cam Baird is a 59 y.o. male.  The patient was referred for their diabetes and blood pressure management.       Referring Provider: Sabino Patiño MD  PCP: CAITLYN Rico-CNP - last visit: 11/21, next visit: 12/27      Subjective   Allergies   Allergen Reactions    Doxycycline Hcl Shortness of breath    Guaifenesin Shortness of breath       [facility] [address] [place] [address] - [address] [facility]  [address] [facility] [address]  Phone: [phone] Fax: [phone]      Medication System Management:  Affordability/Accessibility: medicaid  Adherence/Organization: no issues       Social History     Social History Narrative    Not on file          HPI  Diabetes  He presents for his follow-up diabetic visit. He has type 2 diabetes mellitus. Hypoglycemia symptoms include sweats and tremors. There are no hypoglycemic complications. Risk factors for coronary artery disease include diabetes mellitus, hypertension, male sex and obesity. Current diabetic treatment includes oral agent (triple therapy). He is compliant with treatment all of the time. He participates in exercise intermittently. His overall blood glucose range is 130-220 mg/dl. An ACE inhibitor/angiotensin II receptor blocker is being taken.     Review of Systems        Objective     There were no vitals taken for this visit.   BP Readings from Last 4 Encounters:   11/21/24 112/70   09/26/24 110/80   09/06/24 125/71   09/04/24 116/77      There were no vitals filed for this visit.     LAB  Lab Results   Component Value Date    BILITOT 0.9 09/26/2024    CALCIUM 9.7 09/26/2024    CO2 26 09/26/2024     09/26/2024    CREATININE 0.95 11/13/2024    GLUCOSE 155 (H) 09/26/2024    ALKPHOS 57 09/26/2024    K 4.2 09/26/2024    PROT 6.9 09/26/2024     09/26/2024    AST 23 09/26/2024    ALT 40 09/26/2024    BUN 16 09/26/2024    ANIONGAP 15 09/26/2024    ALBUMIN 4.4 09/26/2024     Lab Results   Component Value  Date    TRIG 179 (H) 09/26/2024    CHOL 174 09/26/2024    LDLCALC 103 (H) 09/26/2024    HDL 35.6 09/26/2024     Lab Results   Component Value Date    HGBA1C 7.3 (A) 09/26/2024         Current Outpatient Medications   Medication Instructions    fluocinonide (Lidex) 0.05 % external solution Topical, 2 times daily    glyBURIDE (DIABETA) 10 mg, oral, 2 times daily    lisinopril 2.5 mg, oral, Daily    metoprolol succinate XL (TOPROL-XL) 50 mg, oral, Daily    Trulicity 1.5 mg, subcutaneous, Weekly            Assessment/Plan   Problem List Items Addressed This Visit    None    HTN  Checking BP's daily and keeping log   BP's have been much improved   Continue with lisinopril and metoprolol   DM  Most recent A1c was 7.3% from POCT on 9/26 which is an improvement  He is checking his sugars about twice daily   Still running in upper 100's into lower 200's for fasting and then around 150-160's in the evenings   No hypo events   Stopped Metformin due to side effects  Still continuing with Glyburide 10mg twice daily (max dose)  He has also been taking Trulicty once weekly --> was increased to 1.5mg weekly  Said he skipped his dose yesterday but feels like he is also having side effects now from this (concerned with swelling in his breast)  He is now hesitant with continuing   We did discuss possibly starting a different oral medication like Jardiance or Farxiga since his sugars are still running higher than ideal but he was also hesitant with starting something new right now   He admitted that his diet is not the best so wants to also try and work on lifestyle changes before adding any other new therapies  He will follow up with his pcp in a few weeks     Follow Up: as needed     Continue all meds under the continuation of care with the referring provider and clinical pharmacy team.    Chito Krishna PharmD     Verbal consent to manage patient's drug therapy was obtained from the patient. They were informed they may decline to  participate or withdraw from participation in pharmacy services at any time.

## 2024-12-19 ENCOUNTER — APPOINTMENT (OUTPATIENT)
Dept: UROLOGY | Facility: HOSPITAL | Age: 59
End: 2024-12-19

## 2024-12-27 ENCOUNTER — APPOINTMENT (OUTPATIENT)
Dept: PRIMARY CARE | Facility: CLINIC | Age: 59
End: 2024-12-27
Payer: MEDICAID

## 2024-12-27 VITALS
TEMPERATURE: 96.8 F | HEART RATE: 75 BPM | DIASTOLIC BLOOD PRESSURE: 70 MMHG | SYSTOLIC BLOOD PRESSURE: 118 MMHG | OXYGEN SATURATION: 97 % | WEIGHT: 315 LBS | BODY MASS INDEX: 47.01 KG/M2

## 2024-12-27 DIAGNOSIS — K61.1 RECTAL ABSCESS: ICD-10-CM

## 2024-12-27 DIAGNOSIS — H40.9 GLAUCOMA, UNSPECIFIED GLAUCOMA TYPE, UNSPECIFIED LATERALITY: ICD-10-CM

## 2024-12-27 DIAGNOSIS — E11.9 TYPE 2 DIABETES MELLITUS WITHOUT COMPLICATION, WITHOUT LONG-TERM CURRENT USE OF INSULIN (MULTI): Primary | ICD-10-CM

## 2024-12-27 LAB — POC HEMOGLOBIN A1C: 7.1 % (ref 4.2–6.5)

## 2024-12-27 PROCEDURE — 99214 OFFICE O/P EST MOD 30 MIN: CPT

## 2024-12-27 PROCEDURE — 3074F SYST BP LT 130 MM HG: CPT

## 2024-12-27 PROCEDURE — 1036F TOBACCO NON-USER: CPT

## 2024-12-27 PROCEDURE — 4010F ACE/ARB THERAPY RXD/TAKEN: CPT

## 2024-12-27 PROCEDURE — 3078F DIAST BP <80 MM HG: CPT

## 2024-12-27 PROCEDURE — 3052F HG A1C>EQUAL 8.0%<EQUAL 9.0%: CPT

## 2024-12-27 PROCEDURE — 83036 HEMOGLOBIN GLYCOSYLATED A1C: CPT

## 2024-12-27 PROCEDURE — 3049F LDL-C 100-129 MG/DL: CPT

## 2024-12-27 NOTE — PROGRESS NOTES
Subjective   Patient ID: Cam Baird is a 59 y.o. male who presents for Follow-up (3 month) and Diabetes (Last A1c done in September 7.3/Today in office it is 7.1).  Diabetes Mellitus  Patient presents for follow up of diabetes. Current symptoms include: hyperglycemia. Symptoms have progressed to a point and plateaued. Patient denies hypoglycemia . Evaluation to date has included: hemoglobin A1C.  Home sugars: BGs are running  consistent with Hgb A1C, between 120 and 200, admittedly he has not eaten a very healthy diet on the holidays. Current treatment: glyburide. Last dilated eye exam: due to have this done, referral placed..    Rectal abscess, no better, gen surg referral placed.   Declined exam today.        Vitals:    12/27/24 1522   BP: 118/70   Pulse: 75   Temp: 36 °C (96.8 °F)   SpO2: 97%       Review of Systems    Objective   Physical Exam    Assessment/Plan   Problem List Items Addressed This Visit    None  Visit Diagnoses       Type 2 diabetes mellitus without complication, without long-term current use of insulin (Multi)    -  Primary    Relevant Orders    POCT glycosylated hemoglobin (Hb A1C) manually resulted (Completed)    Glaucoma, unspecified glaucoma type, unspecified laterality        Relevant Orders    Referral to Ophthalmology    Rectal abscess        Relevant Orders    Referral to General Surgery                 Thank you for coming in today, please call my office if you have any concerns or questions.     Ramos BRADY, CNP

## 2024-12-27 NOTE — PATIENT INSTRUCTIONS
See me in 3 months  Continue the Glyburide  Healthy diet, intermittent fasting    Continue vitamin D supplement recheck in 3 months    Thank you for coming in today, if any questions or concerns arise, please call my office.   CAITLYN Rico-CNP

## 2025-01-06 ENCOUNTER — APPOINTMENT (OUTPATIENT)
Dept: SURGERY | Facility: CLINIC | Age: 60
End: 2025-01-06
Payer: MEDICAID

## 2025-01-06 VITALS
WEIGHT: 315 LBS | TEMPERATURE: 97.6 F | DIASTOLIC BLOOD PRESSURE: 78 MMHG | BODY MASS INDEX: 40.43 KG/M2 | HEART RATE: 67 BPM | SYSTOLIC BLOOD PRESSURE: 136 MMHG | HEIGHT: 74 IN

## 2025-01-06 DIAGNOSIS — K61.1 RECTAL ABSCESS: ICD-10-CM

## 2025-01-06 DIAGNOSIS — R22.2 MASS OF BUTTOCK: Primary | ICD-10-CM

## 2025-01-06 PROCEDURE — 3075F SYST BP GE 130 - 139MM HG: CPT | Performed by: SURGERY

## 2025-01-06 PROCEDURE — 3078F DIAST BP <80 MM HG: CPT | Performed by: SURGERY

## 2025-01-06 PROCEDURE — 1036F TOBACCO NON-USER: CPT | Performed by: SURGERY

## 2025-01-06 PROCEDURE — 3008F BODY MASS INDEX DOCD: CPT | Performed by: SURGERY

## 2025-01-06 PROCEDURE — 99244 OFF/OP CNSLTJ NEW/EST MOD 40: CPT | Performed by: SURGERY

## 2025-01-06 RX ORDER — ACETAMINOPHEN 325 MG/1
975 TABLET ORAL ONCE
OUTPATIENT
Start: 2025-01-06 | End: 2025-01-06

## 2025-01-06 RX ORDER — CELECOXIB 50 MG/1
200 CAPSULE ORAL ONCE
OUTPATIENT
Start: 2025-01-06 | End: 2025-01-06

## 2025-01-06 NOTE — PATIENT INSTRUCTIONS
You have a mass in the left buttock.  We will schedule you for removal of this on January 14, 2025.  My office will provide you with preprocedure instructions.

## 2025-01-06 NOTE — H&P (VIEW-ONLY)
Subjective   Patient ID: Cam Baird is a 59 y.o. male who presents for a recurring infection of the left buttock    HPI   This a patient with history dates back to 2023 December when he developed an infection in the left buttock.  This resolved and then reoccurred since that time is mainly recurring episodes with the start of increasing frequency.  He takes oral antibiotics but then the lesion will recur he is diabetic.  He is never had a colonoscopy.  He does not have a family history of colon cancer  Past Medical History:   Diagnosis Date    Dysmetabolic syndrome X     Hypertension         Current Outpatient Medications on File Prior to Visit   Medication Sig Dispense Refill    glyBURIDE (Diabeta) 5 mg tablet Take 2 tablets (10 mg) by mouth 2 times a day. 120 tablet 11    lisinopril 2.5 mg tablet Take 1 tablet (2.5 mg) by mouth once daily. 90 tablet 1    metoprolol succinate XL (Toprol-XL) 50 mg 24 hr tablet Take 1 tablet (50 mg) by mouth once daily. 90 tablet 1     No current facility-administered medications on file prior to visit.        Review of Systems   All other systems reviewed and are negative.      Vitals:    01/06/25 1238   BP: 136/78   Pulse: 67   Temp: 36.4 °C (97.6 °F)        Objective     Physical Exam  Vitals reviewed.   Constitutional:       Appearance: Normal appearance.   HENT:      Head: Normocephalic.   Cardiovascular:      Rate and Rhythm: Normal rate and regular rhythm.      Heart sounds: Normal heart sounds.   Pulmonary:      Effort: Pulmonary effort is normal.      Breath sounds: Normal breath sounds.   Abdominal:      General: Abdomen is flat. There is no distension.      Palpations: Abdomen is soft. There is no mass.      Tenderness: There is no abdominal tenderness. There is no guarding.   Musculoskeletal:         General: Normal range of motion.   Skin:     General: Skin is warm.             Comments: Left buttock rash with cystic swelling and deep induration   Neurological:       General: No focal deficit present.   Psychiatric:         Mood and Affect: Mood normal.         Problem List Items Addressed This Visit       Mass of buttock - Primary    BMI 45.0-49.9, adult (Multi)     Other Visit Diagnoses       Rectal abscess                 Assessment/Plan   Recurring left buttock mass  Wide excision left buttock mass 1.14.25   Risks include, but not limited to pain, infection, bleeding, cardiac, pulmonary, neurologic, locomotor, anesthetic events and other unforeseen complications, including death     Parveen Sorto MD

## 2025-01-07 ENCOUNTER — PRE-ADMISSION TESTING (OUTPATIENT)
Dept: PREADMISSION TESTING | Facility: HOSPITAL | Age: 60
End: 2025-01-07
Payer: MEDICAID

## 2025-01-07 VITALS — WEIGHT: 315 LBS | BODY MASS INDEX: 40.43 KG/M2 | HEIGHT: 74 IN

## 2025-01-07 DIAGNOSIS — R19.04 LEFT LOWER QUADRANT ABDOMINAL MASS: Primary | ICD-10-CM

## 2025-01-07 RX ORDER — CHLORHEXIDINE GLUCONATE 40 MG/ML
SOLUTION TOPICAL DAILY
Qty: 354 ML | Refills: 0 | Status: SHIPPED | OUTPATIENT
Start: 2025-01-07 | End: 2025-01-14 | Stop reason: HOSPADM

## 2025-01-07 RX ORDER — ACETAMINOPHEN 500 MG
2000 TABLET ORAL EVERY OTHER DAY
COMMUNITY

## 2025-01-07 ASSESSMENT — DUKE ACTIVITY SCORE INDEX (DASI)
CAN YOU DO LIGHT WORK AROUND THE HOUSE LIKE DUSTING OR WASHING DISHES: YES
CAN YOU WALK INDOORS, SUCH AS AROUND YOUR HOUSE: YES
CAN YOU DO YARD WORK LIKE RAKING LEAVES, WEEDING OR PUSHING A MOWER: YES
CAN YOU TAKE CARE OF YOURSELF (EAT, DRESS, BATHE, OR USE TOILET): YES
CAN YOU PARTICIPATE IN MODERATE RECREATIONAL ACTIVITIES LIKE GOLF, BOWLING, DANCING, DOUBLES TENNIS OR THROWING A BASEBALL OR FOOTBALL: NO
CAN YOU WALK A BLOCK OR TWO ON LEVEL GROUND: YES
CAN YOU RUN A SHORT DISTANCE: NO
CAN YOU DO HEAVY WORK AROUND THE HOUSE LIKE SCRUBBING FLOORS OR LIFTING AND MOVING HEAVY FURNITURE: YES
CAN YOU DO MODERATE WORK AROUND THE HOUSE LIKE VACUUMING, SWEEPING FLOORS OR CARRYING GROCERIES: YES
CAN YOU PARTICIPATE IN STRENOUS SPORTS LIKE SWIMMING, SINGLES TENNIS, FOOTBALL, BASKETBALL, OR SKIING: NO
CAN YOU CLIMB A FLIGHT OF STAIRS OR WALK UP A HILL: YES

## 2025-01-07 NOTE — PREPROCEDURE INSTRUCTIONS
Medication List            Accurate as of January 7, 2025  9:02 AM. Always use your most recent med list.                glyBURIDE 5 mg tablet  Commonly known as: Diabeta  Take 2 tablets (10 mg) by mouth 2 times a day.  Medication Adjustments for Surgery: Take/Use as prescribed     lisinopril 2.5 mg tablet  Take 1 tablet (2.5 mg) by mouth once daily.  Medication Adjustments for Surgery: Take/Use as prescribed     metoprolol succinate XL 50 mg 24 hr tablet  Commonly known as: Toprol-XL  Take 1 tablet (50 mg) by mouth once daily.  Medication Adjustments for Surgery: Take/Use as prescribed     Vitamin D3 50 mcg (2,000 unit) capsule  Generic drug: cholecalciferol  Medication Adjustments for Surgery: Take/Use as prescribed                              NPO Instructions:    You are going to be a local case. You can have a light breakfast and liquids until a few hours before your surgery. Do not have a meal right before the procedure- you can get nauseated.     Additional Instructions:     Review your medication instructions, take indicated medications  Wear  comfortable loose fitting clothing  All jewelry and valuables should be left at home    Park in back of hospital by ER. Come up to Second floor-Outpt dept to check in.  Bring Photo ID and Insurance card,   You MUST have a  with you.  This is a recommendation- not mandatory. No more than 2 visitors with you please.      If you get ill at all before your procedure- CALL YOUR DOCTOR/SURGEON.  We want you in the best shape that is possible. Any sickness might lead to your procedure being delayed.      Call Outpatient dept at [phone] the night before your procedure (Friday for Monday procedure), between 1-3 pm.      One prescription was called in to your pharmacy. It is called Chlorhexidine or Hibiclens. It is a liquid soap. Use with your shower the day before your procedure and morning of the surgery. Use your own shampoo and soap, then shut off the water and  apply the liquid soap to your body- neck down (avoid the genitals). Leave on for 3 minutes, then rinse. Do not use lotion or powder.

## 2025-01-14 ENCOUNTER — HOSPITAL ENCOUNTER (OUTPATIENT)
Facility: HOSPITAL | Age: 60
Setting detail: OUTPATIENT SURGERY
Discharge: HOME | End: 2025-01-14
Attending: SURGERY | Admitting: SURGERY
Payer: MEDICAID

## 2025-01-14 ENCOUNTER — PHARMACY VISIT (OUTPATIENT)
Dept: PHARMACY | Facility: CLINIC | Age: 60
End: 2025-01-14
Payer: MEDICAID

## 2025-01-14 VITALS
SYSTOLIC BLOOD PRESSURE: 123 MMHG | BODY MASS INDEX: 40.43 KG/M2 | HEART RATE: 62 BPM | DIASTOLIC BLOOD PRESSURE: 75 MMHG | OXYGEN SATURATION: 99 % | WEIGHT: 315 LBS | RESPIRATION RATE: 18 BRPM | TEMPERATURE: 97.2 F | HEIGHT: 74 IN

## 2025-01-14 DIAGNOSIS — R22.2 MASS OF BUTTOCK: Primary | ICD-10-CM

## 2025-01-14 PROCEDURE — 0752T DGTZ GLS MCRSCP SLD LVL III: CPT | Mod: TC,GENLAB | Performed by: SURGERY

## 2025-01-14 PROCEDURE — RXMED WILLOW AMBULATORY MEDICATION CHARGE

## 2025-01-14 PROCEDURE — 7100000010 HC PHASE TWO TIME - EACH INCREMENTAL 1 MINUTE: Performed by: SURGERY

## 2025-01-14 PROCEDURE — 27047 EXC HIP/PELVIS LES SC < 3 CM: CPT | Performed by: SURGERY

## 2025-01-14 PROCEDURE — 7100000009 HC PHASE TWO TIME - INITIAL BASE CHARGE: Performed by: SURGERY

## 2025-01-14 PROCEDURE — 3600000003 HC OR TIME - INITIAL BASE CHARGE - PROCEDURE LEVEL THREE: Performed by: SURGERY

## 2025-01-14 PROCEDURE — 88304 TISSUE EXAM BY PATHOLOGIST: CPT | Performed by: PATHOLOGY

## 2025-01-14 PROCEDURE — 2500000004 HC RX 250 GENERAL PHARMACY W/ HCPCS (ALT 636 FOR OP/ED): Mod: SE,TB | Performed by: SURGERY

## 2025-01-14 PROCEDURE — 2720000007 HC OR 272 NO HCPCS: Performed by: SURGERY

## 2025-01-14 PROCEDURE — 2500000001 HC RX 250 WO HCPCS SELF ADMINISTERED DRUGS (ALT 637 FOR MEDICARE OP): Mod: SE | Performed by: SURGERY

## 2025-01-14 PROCEDURE — 3600000008 HC OR TIME - EACH INCREMENTAL 1 MINUTE - PROCEDURE LEVEL THREE: Performed by: SURGERY

## 2025-01-14 RX ORDER — ACETAMINOPHEN 325 MG/1
975 TABLET ORAL ONCE
Status: COMPLETED | OUTPATIENT
Start: 2025-01-14 | End: 2025-01-14

## 2025-01-14 RX ORDER — FENTANYL CITRATE 50 UG/ML
25 INJECTION, SOLUTION INTRAMUSCULAR; INTRAVENOUS EVERY 5 MIN PRN
Status: DISCONTINUED | OUTPATIENT
Start: 2025-01-14 | End: 2025-01-14 | Stop reason: HOSPADM

## 2025-01-14 RX ORDER — CEFAZOLIN SODIUM 1 G/50ML
SOLUTION INTRAVENOUS
Status: DISCONTINUED
Start: 2025-01-14 | End: 2025-01-14 | Stop reason: HOSPADM

## 2025-01-14 RX ORDER — LIDOCAINE HYDROCHLORIDE 10 MG/ML
INJECTION, SOLUTION INFILTRATION; PERINEURAL AS NEEDED
Status: DISCONTINUED | OUTPATIENT
Start: 2025-01-14 | End: 2025-01-14 | Stop reason: HOSPADM

## 2025-01-14 RX ORDER — KETOROLAC TROMETHAMINE 30 MG/ML
30 INJECTION, SOLUTION INTRAMUSCULAR; INTRAVENOUS ONCE
Status: DISCONTINUED | OUTPATIENT
Start: 2025-01-14 | End: 2025-01-14 | Stop reason: HOSPADM

## 2025-01-14 RX ORDER — IBUPROFEN 600 MG/1
600 TABLET ORAL EVERY 6 HOURS PRN
Qty: 20 TABLET | Refills: 0 | Status: SHIPPED | OUTPATIENT
Start: 2025-01-14 | End: 2025-01-24

## 2025-01-14 RX ORDER — BUPIVACAINE HYDROCHLORIDE 5 MG/ML
INJECTION, SOLUTION EPIDURAL; INTRACAUDAL AS NEEDED
Status: DISCONTINUED | OUTPATIENT
Start: 2025-01-14 | End: 2025-01-14 | Stop reason: HOSPADM

## 2025-01-14 RX ORDER — CEFAZOLIN SODIUM 2 G/50ML
SOLUTION INTRAVENOUS
Status: DISCONTINUED
Start: 2025-01-14 | End: 2025-01-14 | Stop reason: HOSPADM

## 2025-01-14 RX ORDER — ONDANSETRON HYDROCHLORIDE 2 MG/ML
4 INJECTION, SOLUTION INTRAVENOUS ONCE AS NEEDED
Status: DISCONTINUED | OUTPATIENT
Start: 2025-01-14 | End: 2025-01-14 | Stop reason: HOSPADM

## 2025-01-14 RX ORDER — CELECOXIB 100 MG/1
200 CAPSULE ORAL ONCE
Status: COMPLETED | OUTPATIENT
Start: 2025-01-14 | End: 2025-01-14

## 2025-01-14 RX ORDER — ACETAMINOPHEN 325 MG/1
650 TABLET ORAL EVERY 6 HOURS PRN
Qty: 20 TABLET | Refills: 0 | Status: SHIPPED | OUTPATIENT
Start: 2025-01-14 | End: 2025-01-24

## 2025-01-14 RX ORDER — OXYCODONE HYDROCHLORIDE 5 MG/1
5 TABLET ORAL EVERY 4 HOURS PRN
Status: DISCONTINUED | OUTPATIENT
Start: 2025-01-14 | End: 2025-01-14 | Stop reason: HOSPADM

## 2025-01-14 RX ADMIN — CEFAZOLIN SODIUM 3 G: 10 INJECTION, POWDER, FOR SOLUTION INTRAVENOUS at 11:10

## 2025-01-14 RX ADMIN — ACETAMINOPHEN 975 MG: 325 TABLET, FILM COATED ORAL at 10:11

## 2025-01-14 RX ADMIN — CELECOXIB 200 MG: 100 CAPSULE ORAL at 10:11

## 2025-01-14 ASSESSMENT — PAIN - FUNCTIONAL ASSESSMENT
PAIN_FUNCTIONAL_ASSESSMENT: 0-10

## 2025-01-14 ASSESSMENT — PAIN SCALES - GENERAL
PAINLEVEL_OUTOF10: 0 - NO PAIN
PAINLEVEL_OUTOF10: 0 - NO PAIN
PAINLEVEL_OUTOF10: 2
PAINLEVEL_OUTOF10: 0 - NO PAIN

## 2025-01-14 ASSESSMENT — COLUMBIA-SUICIDE SEVERITY RATING SCALE - C-SSRS
1. IN THE PAST MONTH, HAVE YOU WISHED YOU WERE DEAD OR WISHED YOU COULD GO TO SLEEP AND NOT WAKE UP?: NO
6. HAVE YOU EVER DONE ANYTHING, STARTED TO DO ANYTHING, OR PREPARED TO DO ANYTHING TO END YOUR LIFE?: NO
2. HAVE YOU ACTUALLY HAD ANY THOUGHTS OF KILLING YOURSELF?: NO

## 2025-01-14 NOTE — OP NOTE
EXCISION, SOFT TISSUE, BUTTOCK (L) Operative Note     Date: 2025  OR Location: [facility]    Name: Cam Baird, : 1965, Age: 59 y.o., MRN: [id number], Sex: male    Diagnosis  Pre-op Diagnosis      * Mass of buttock [R22.2] Post-op Diagnosis     * Mass of buttock [R22.2]     Procedures  EXCISION, SOFT TISSUE, BUTTOCK  60415 - [place] EXC TUMOR SOFT TISSUE PELVIS & HIP SUBQ <3CM      Surgeons      * Parveen Sorto - Primary    Resident/Fellow/Other Assistant:  Surgeons and Role:  * No surgeons found with a matching role *    Staff:   Circulator: Tess Smythub Person: Elsi HAWKINSA: Isabela    Anesthesia Staff: No anesthesia staff entered.    Procedure Summary  Anesthesia: Anesthesia type not filed in the log.  ASA: ASA status not filed in the log.  Estimated Blood Loss: 4 mL  Intra-op Medications:   Administrations occurring from 1100 to 1220 on 25:   Medication Name Total Dose   bupivacaine PF 0.5 % (Marcaine) 0.5 % (5 mg/mL) injection 25 mL   lidocaine (Xylocaine) 10 mg/mL (1 %) injection 25 mL   ceFAZolin (Ancef) 3 g in sodium chloride 0.9% 100 mL IV 99.67 mL              Anesthesia Record               Intraprocedure I/O Totals       None           Specimen:   ID Type Source Tests Collected by Time   1 : BUTTOCK MASS, SUTURE MARKS LONG LATERAL, SHORT SUPERIOR Tissue SOFT TISSUE MASS RESECTION SURGICAL PATHOLOGY EXAM Parveen Sorto MD 2025 1125                 Drains and/or Catheters: * None in log *    Tourniquet Times:         Implants:     Findings: 3 x 2 x 1 cm deep tissue left buttock mass excised completely and close primarily with 0 Vicryl suture and 4-0 Prolene vertical mattress suture    Indications: Cam Baird is an 59 y.o. male who is having surgery for Mass of buttock [R22.2].   Patient had a recurring lesion of the buttock.  This appeared to be a deep possible lesion of the cyst.    The patient was seen in the preoperative area. The risks, benefits, complications, treatment  options, non-operative alternatives, expected recovery and outcomes were discussed with the patient. The possibilities of reaction to medication, pulmonary aspiration, injury to surrounding structures, bleeding, recurrent infection, the need for additional procedures, failure to diagnose a condition, and creating a complication requiring transfusion or operation were discussed with the patient. The patient concurred with the proposed plan, giving informed consent.  The site of surgery was properly noted/marked if necessary per policy. The patient has been actively warmed in preoperative area. Preoperative antibiotics have been ordered and given within 1 hours of incision. Venous thrombosis prophylaxis are not indicated.    Procedure Details: After obtaining informed consent of the patient discussing all the risks which include but not limited to pain, infection, bleeding, cardiac, pulmonary, neurologic, locomotor, anesthetic events, and other unforeseen complications, including death, placed in the left lateral decubitus position.  Local anesthetic instilled after prepping the patient.  The buttock lesion was then excised using an elliptical incision.  This performed in a craniocaudal direction.  Sections down to the deep tissue.  Entire specimen was excised and sent to pathology.  2 layer closure semiformed closed in the deep layer with an 0 Vicryl suture.  Skin was closed with interrupted vertical mattress 4-0 Prolene suture.  Dressings were placed.  Patient Toller procedure well.  All needle sponge and instrument counts were correct x 2.    Complications:  None; patient tolerated the procedure well.    Disposition: PACU - hemodynamically stable.  Condition: stable     Task Performed by RNFA or Surgical Assistant:  The surgical assistant assisted with positioning, preparation of the surgical site, tissue retraction, suctioning, due to the nature of the case and the difficulty of the case.  The surgical assistant  performed a primary closure and dressing application.     Additional Details: Time equals 30 minutes.  Wound classification 2.  Body mass index 46    Attending Attestation: I performed the procedure.    Parveen Sorto  Phone Number: [phone]

## 2025-01-15 ASSESSMENT — PAIN SCALES - GENERAL: PAINLEVEL_OUTOF10: 0 - NO PAIN

## 2025-01-24 ENCOUNTER — TELEPHONE (OUTPATIENT)
Dept: SURGERY | Facility: CLINIC | Age: 60
End: 2025-01-24
Payer: MEDICAID

## 2025-01-24 LAB
LABORATORY COMMENT REPORT: NORMAL
PATH REPORT.FINAL DX SPEC: NORMAL
PATH REPORT.GROSS SPEC: NORMAL
PATH REPORT.RELEVANT HX SPEC: NORMAL
PATH REPORT.TOTAL CANCER: NORMAL

## 2025-01-24 NOTE — TELEPHONE ENCOUNTER
----- Message from Parveen Sorto sent at 1/24/2025  2:12 PM EST -----  Let him know that the biopsy of the buttock mass showed inflammatory tissue - he has a follow up appt to remove sutures - see how he is doing - if area dry apply aquaphor daily

## 2025-02-05 ENCOUNTER — APPOINTMENT (OUTPATIENT)
Dept: SURGERY | Facility: CLINIC | Age: 60
End: 2025-02-05
Payer: MEDICAID

## 2025-02-05 VITALS
DIASTOLIC BLOOD PRESSURE: 79 MMHG | SYSTOLIC BLOOD PRESSURE: 126 MMHG | BODY MASS INDEX: 40.43 KG/M2 | TEMPERATURE: 98.1 F | HEIGHT: 74 IN | WEIGHT: 315 LBS | HEART RATE: 59 BPM

## 2025-02-05 DIAGNOSIS — R22.2 MASS OF BUTTOCK: Primary | ICD-10-CM

## 2025-02-05 DIAGNOSIS — Z09 SURGICAL FOLLOW-UP CARE: ICD-10-CM

## 2025-02-05 PROCEDURE — 3074F SYST BP LT 130 MM HG: CPT | Performed by: SURGERY

## 2025-02-05 PROCEDURE — 99024 POSTOP FOLLOW-UP VISIT: CPT | Performed by: SURGERY

## 2025-02-05 PROCEDURE — 3008F BODY MASS INDEX DOCD: CPT | Performed by: SURGERY

## 2025-02-05 PROCEDURE — 3078F DIAST BP <80 MM HG: CPT | Performed by: SURGERY

## 2025-02-05 PROCEDURE — 1036F TOBACCO NON-USER: CPT | Performed by: SURGERY

## 2025-02-05 NOTE — PATIENT INSTRUCTIONS
Doing very well since your operation to remove the left buttock mass.  Incisions of healed nicely.  The sutures were removed today.  Pathology confirms inflammation and negative cyst that was ruptured.  Continue the topical Vaseline.  I will see you as needed.

## 2025-02-05 NOTE — PROGRESS NOTES
Patient ID: Cam Baird is a 59 y.o. male.  Following up after excision of left buttock mass.-Doing well.  Incision healed nicely.      Objective   Physical Exam  Skin:     Comments: Nicely healed left buttock incision.     Sutures removed    Review of pathology  Surgical Pathology Exam: [id number]  Order: [id number]   Collected 1/14/2025 11:25       Status: Final result       Visible to patient: No (inaccessible in [facility])       Dx: Mass of buttock    1 Result Note       1 Follow-up Encounter       Component  Resulting Agency   FINAL DIAGNOSIS   A. SKIN, BUTTOCK, EXCISION:  -- SQUAMOUS EPITHELIAL-LINED CYST, RUPTURED AND BRISKLY INFLAMED     COMMENT: There is overlying irregular epidermal hyperplasia and dermal fibrosis compatible with chronic irritation changes.        Problem List Items Addressed This Visit       Mass of buttock - Primary    Surgical follow-up care        Assessment/Plan   Doing well status post excision of left buttock mass.  Pathology benign.  Sutures removed.  Continue topical Vaseline ointment.  Follow as needed.

## 2025-03-25 ENCOUNTER — APPOINTMENT (OUTPATIENT)
Dept: CARDIOLOGY | Facility: HOSPITAL | Age: 60
End: 2025-03-25
Payer: MEDICAID

## 2025-03-25 ENCOUNTER — APPOINTMENT (OUTPATIENT)
Dept: RADIOLOGY | Facility: HOSPITAL | Age: 60
End: 2025-03-25
Payer: MEDICAID

## 2025-03-25 ENCOUNTER — HOSPITAL ENCOUNTER (EMERGENCY)
Facility: HOSPITAL | Age: 60
Discharge: HOME | End: 2025-03-25
Attending: EMERGENCY MEDICINE
Payer: MEDICAID

## 2025-03-25 VITALS
TEMPERATURE: 97.7 F | OXYGEN SATURATION: 99 % | SYSTOLIC BLOOD PRESSURE: 124 MMHG | BODY MASS INDEX: 40.43 KG/M2 | WEIGHT: 315 LBS | RESPIRATION RATE: 18 BRPM | HEIGHT: 74 IN | HEART RATE: 78 BPM | DIASTOLIC BLOOD PRESSURE: 70 MMHG

## 2025-03-25 DIAGNOSIS — R03.0 ELEVATED BLOOD PRESSURE READING: ICD-10-CM

## 2025-03-25 DIAGNOSIS — R00.2 PALPITATION: Primary | ICD-10-CM

## 2025-03-25 DIAGNOSIS — K80.20 CALCULUS OF GALLBLADDER WITHOUT CHOLECYSTITIS WITHOUT OBSTRUCTION: ICD-10-CM

## 2025-03-25 LAB
ALBUMIN SERPL BCP-MCNC: 4.5 G/DL (ref 3.4–5)
ALP SERPL-CCNC: 57 U/L (ref 33–136)
ALT SERPL W P-5'-P-CCNC: 77 U/L (ref 10–52)
ANION GAP SERPL CALC-SCNC: 13 MMOL/L (ref 10–20)
AST SERPL W P-5'-P-CCNC: 47 U/L (ref 9–39)
BASOPHILS # BLD AUTO: 0.02 X10*3/UL (ref 0–0.1)
BASOPHILS NFR BLD AUTO: 0.2 %
BILIRUB SERPL-MCNC: 0.9 MG/DL (ref 0–1.2)
BUN SERPL-MCNC: 14 MG/DL (ref 6–23)
CALCIUM SERPL-MCNC: 9.3 MG/DL (ref 8.6–10.3)
CARDIAC TROPONIN I PNL SERPL HS: 15 NG/L (ref 0–20)
CARDIAC TROPONIN I PNL SERPL HS: 20 NG/L (ref 0–20)
CHLORIDE SERPL-SCNC: 100 MMOL/L (ref 98–107)
CO2 SERPL-SCNC: 27 MMOL/L (ref 21–32)
CREAT SERPL-MCNC: 0.86 MG/DL (ref 0.5–1.3)
D DIMER PPP FEU-MCNC: 1343 NG/ML FEU
EGFRCR SERPLBLD CKD-EPI 2021: >90 ML/MIN/1.73M*2
EOSINOPHIL # BLD AUTO: 0.21 X10*3/UL (ref 0–0.7)
EOSINOPHIL NFR BLD AUTO: 2.3 %
ERYTHROCYTE [DISTWIDTH] IN BLOOD BY AUTOMATED COUNT: 12.3 % (ref 11.5–14.5)
GLUCOSE SERPL-MCNC: 170 MG/DL (ref 74–99)
HCT VFR BLD AUTO: 46.1 % (ref 41–52)
HGB BLD-MCNC: 15.7 G/DL (ref 13.5–17.5)
HOLD SPECIMEN: NORMAL
IMM GRANULOCYTES # BLD AUTO: 0.05 X10*3/UL (ref 0–0.7)
IMM GRANULOCYTES NFR BLD AUTO: 0.6 % (ref 0–0.9)
LYMPHOCYTES # BLD AUTO: 2.03 X10*3/UL (ref 1.2–4.8)
LYMPHOCYTES NFR BLD AUTO: 22.5 %
MAGNESIUM SERPL-MCNC: 2.07 MG/DL (ref 1.6–2.4)
MCH RBC QN AUTO: 32.7 PG (ref 26–34)
MCHC RBC AUTO-ENTMCNC: 34.1 G/DL (ref 32–36)
MCV RBC AUTO: 96 FL (ref 80–100)
MONOCYTES # BLD AUTO: 0.89 X10*3/UL (ref 0.1–1)
MONOCYTES NFR BLD AUTO: 9.9 %
NEUTROPHILS # BLD AUTO: 5.83 X10*3/UL (ref 1.2–7.7)
NEUTROPHILS NFR BLD AUTO: 64.5 %
NRBC BLD-RTO: 0 /100 WBCS (ref 0–0)
PLATELET # BLD AUTO: 146 X10*3/UL (ref 150–450)
POTASSIUM SERPL-SCNC: 3.8 MMOL/L (ref 3.5–5.3)
PROT SERPL-MCNC: 7.4 G/DL (ref 6.4–8.2)
RBC # BLD AUTO: 4.8 X10*6/UL (ref 4.5–5.9)
SODIUM SERPL-SCNC: 136 MMOL/L (ref 136–145)
TSH SERPL-ACNC: 2.46 MIU/L (ref 0.44–3.98)
WBC # BLD AUTO: 9 X10*3/UL (ref 4.4–11.3)

## 2025-03-25 PROCEDURE — 71275 CT ANGIOGRAPHY CHEST: CPT | Performed by: STUDENT IN AN ORGANIZED HEALTH CARE EDUCATION/TRAINING PROGRAM

## 2025-03-25 PROCEDURE — 93005 ELECTROCARDIOGRAM TRACING: CPT

## 2025-03-25 PROCEDURE — 99285 EMERGENCY DEPT VISIT HI MDM: CPT | Mod: 25 | Performed by: EMERGENCY MEDICINE

## 2025-03-25 PROCEDURE — 85025 COMPLETE CBC W/AUTO DIFF WBC: CPT | Performed by: EMERGENCY MEDICINE

## 2025-03-25 PROCEDURE — 84484 ASSAY OF TROPONIN QUANT: CPT | Performed by: EMERGENCY MEDICINE

## 2025-03-25 PROCEDURE — 36415 COLL VENOUS BLD VENIPUNCTURE: CPT | Performed by: EMERGENCY MEDICINE

## 2025-03-25 PROCEDURE — 80053 COMPREHEN METABOLIC PANEL: CPT | Performed by: EMERGENCY MEDICINE

## 2025-03-25 PROCEDURE — 84443 ASSAY THYROID STIM HORMONE: CPT | Performed by: EMERGENCY MEDICINE

## 2025-03-25 PROCEDURE — 2550000001 HC RX 255 CONTRASTS: Mod: SE | Performed by: EMERGENCY MEDICINE

## 2025-03-25 PROCEDURE — 83735 ASSAY OF MAGNESIUM: CPT | Performed by: EMERGENCY MEDICINE

## 2025-03-25 PROCEDURE — 85379 FIBRIN DEGRADATION QUANT: CPT | Performed by: EMERGENCY MEDICINE

## 2025-03-25 PROCEDURE — 71275 CT ANGIOGRAPHY CHEST: CPT

## 2025-03-25 RX ADMIN — IOHEXOL 50 ML: 350 INJECTION, SOLUTION INTRAVENOUS at 21:30

## 2025-03-25 ASSESSMENT — PAIN SCALES - GENERAL
PAINLEVEL_OUTOF10: 0 - NO PAIN

## 2025-03-25 ASSESSMENT — HEART SCORE
ECG: NORMAL
AGE: 45-64
TROPONIN: LESS THAN OR EQUAL TO NORMAL LIMIT
HISTORY: SLIGHTLY SUSPICIOUS
HEART SCORE: 2
RISK FACTORS: 1-2 RISK FACTORS

## 2025-03-25 ASSESSMENT — COLUMBIA-SUICIDE SEVERITY RATING SCALE - C-SSRS
6. HAVE YOU EVER DONE ANYTHING, STARTED TO DO ANYTHING, OR PREPARED TO DO ANYTHING TO END YOUR LIFE?: NO
2. HAVE YOU ACTUALLY HAD ANY THOUGHTS OF KILLING YOURSELF?: NO
1. IN THE PAST MONTH, HAVE YOU WISHED YOU WERE DEAD OR WISHED YOU COULD GO TO SLEEP AND NOT WAKE UP?: NO

## 2025-03-25 ASSESSMENT — PAIN - FUNCTIONAL ASSESSMENT
PAIN_FUNCTIONAL_ASSESSMENT: 0-10

## 2025-03-26 LAB
ATRIAL RATE: 72 BPM
ATRIAL RATE: 84 BPM
P AXIS: 55 DEGREES
P AXIS: 65 DEGREES
P OFFSET: 194 MS
P OFFSET: 197 MS
P ONSET: 134 MS
P ONSET: 136 MS
PR INTERVAL: 140 MS
PR INTERVAL: 140 MS
Q ONSET: 204 MS
Q ONSET: 206 MS
QRS COUNT: 12 BEATS
QRS COUNT: 13 BEATS
QRS DURATION: 118 MS
QRS DURATION: 120 MS
QT INTERVAL: 396 MS
QT INTERVAL: 422 MS
QTC CALCULATION(BAZETT): 462 MS
QTC CALCULATION(BAZETT): 467 MS
QTC FREDERICIA: 442 MS
QTC FREDERICIA: 448 MS
R AXIS: -62 DEGREES
R AXIS: -65 DEGREES
T AXIS: 62 DEGREES
T AXIS: 80 DEGREES
T OFFSET: 404 MS
T OFFSET: 415 MS
VENTRICULAR RATE: 72 BPM
VENTRICULAR RATE: 84 BPM

## 2025-03-26 NOTE — ED PROVIDER NOTES
HPI   Chief Complaint   Patient presents with    Rapid Heart Rate         History provided by:  Medical records and patient   used: No      This patient presents to the emergency department for evaluation of chest tightness, palpitations, pounding fast heart rate.  Patient states that he was simply standing watching the hose fill the stock tank when he began to have the symptoms.  He states he decided to check his blood pressure and it was in the 150s which is high for what he usually experiences.  He states he realized at that point that he may have forgotten to take his metoprolol yesterday.  He has a history of arrhythmias and palpitations that the metoprolol is prescribed for.    Patient denies any recent fevers, chills, coughs, URI symptoms no shortness of breath, nausea, vomiting, diaphoresis.  He has some puffiness in his feet last week.  No history of injury.  He is no longer feeling the symptoms.      Patient History   Past Medical History:   Diagnosis Date    Dysmetabolic syndrome X     Hypertension     Type 2 diabetes mellitus      Past Surgical History:   Procedure Laterality Date    SEPTOPLASTY       Family History   Problem Relation Name Age of Onset    Diabetes type II Mother      Other (pulmondary hypertension) Mother      Diabetes type II Father      Heart attack Father       Social History     Tobacco Use    Smoking status: Never    Smokeless tobacco: Never   Vaping Use    Vaping status: Never Used   Substance Use Topics    Alcohol use: Not Currently    Drug use: Never       Physical Exam   ED Triage Vitals   Temperature Heart Rate Respirations BP   03/25/25 2016 03/25/25 2015 03/25/25 2015 03/25/25 2016   36.4 °C (97.5 °F) 74 17 153/82      SpO2 Temp src Heart Rate Source Patient Position   03/25/25 2015 -- -- --   96 %         BP Location FiO2 (%)     -- --             Physical Exam  Vitals reviewed.   Constitutional:       Appearance: Normal appearance.   HENT:      Head:  "Normocephalic and atraumatic.      Right Ear: External ear normal.      Left Ear: External ear normal.      Nose: Nose normal.      Mouth/Throat:      Mouth: Mucous membranes are moist.   Eyes:      Extraocular Movements: Extraocular movements intact.      Conjunctiva/sclera: Conjunctivae normal.      Pupils: Pupils are equal, round, and reactive to light.   Cardiovascular:      Rate and Rhythm: Normal rate and regular rhythm.      Pulses: Normal pulses.   Pulmonary:      Effort: Pulmonary effort is normal.      Breath sounds: Normal breath sounds.   Abdominal:      General: Bowel sounds are normal.      Palpations: Abdomen is soft. There is no mass.      Tenderness: There is no abdominal tenderness.   Musculoskeletal:         General: Normal range of motion.      Cervical back: Normal range of motion.      Right lower leg: No edema.      Left lower leg: No edema.   Skin:     General: Skin is warm and dry.      Capillary Refill: Capillary refill takes less than 2 seconds.   Neurological:      General: No focal deficit present.      Mental Status: He is alert and oriented to person, place, and time.      Sensory: No sensory deficit.      Motor: No weakness.      Gait: Gait normal.   Psychiatric:         Mood and Affect: Mood normal.           ED Course & MDM   ED Course as of 03/25/25 2229   Tue Mar 25, 2025   2100 Patient reassessed, \"I feel normal\" [MN]   2204 Remains asymptomatic [MN]   2215 Patient reassessed, results reviewed [MN]      ED Course User Index  [MN] Aby Martinez MD         Diagnoses as of 03/25/25 2229   Palpitation   Calculus of gallbladder without cholecystitis without obstruction   Elevated blood pressure reading          Labs Reviewed   CBC WITH AUTO DIFFERENTIAL - Abnormal       Result Value    WBC 9.0      nRBC 0.0      RBC 4.80      Hemoglobin 15.7      Hematocrit 46.1      MCV 96      MCH 32.7      MCHC 34.1      RDW 12.3      Platelets 146 (*)     Neutrophils % 64.5      Immature " Granulocytes %, Automated 0.6      Lymphocytes % 22.5      Monocytes % 9.9      Eosinophils % 2.3      Basophils % 0.2      Neutrophils Absolute 5.83      Immature Granulocytes Absolute, Automated 0.05      Lymphocytes Absolute 2.03      Monocytes Absolute 0.89      Eosinophils Absolute 0.21      Basophils Absolute 0.02     COMPREHENSIVE METABOLIC PANEL - Abnormal    Glucose 170 (*)     Sodium 136      Potassium 3.8      Chloride 100      Bicarbonate 27      Anion Gap 13      Urea Nitrogen 14      Creatinine 0.86      eGFR >90      Calcium 9.3      Albumin 4.5      Alkaline Phosphatase 57      Total Protein 7.4      AST 47 (*)     Bilirubin, Total 0.9      ALT 77 (*)    D-DIMER, VTE EXCLUSION - Abnormal    D-Dimer, Quantitative VTE Exclusion 1,343 (*)     Narrative:     The VTE Exclusion D-Dimer assay is reported in ng/mL Fibrinogen Equivalent Units (FEU).    Per 's instructions for use, a value of less than 500 ng/mL (FEU) may help to exclude DVT or PE in outpatients when the assay is used with a clinical pretest probability assessment.(AEMR must utilize and document eCalc 'Wells Score Deep Vein Thrombosis Risk' for DVT exclusion only. Emergency Department should utilize  Guidelines for Emergency Department Use of the VTE Exclusion D-Dimer and Clinical Pretest probability assessment model for DVT or PE exclusion.)   MAGNESIUM - Normal    Magnesium 2.07     TSH WITH REFLEX TO FREE T4 IF ABNORMAL - Normal    Thyroid Stimulating Hormone 2.46      Narrative:     TSH testing is performed using different testing methodology at [facility] than at other [facility]. Direct result comparisons should only be made within the same method.     SERIAL TROPONIN-INITIAL - Normal    Troponin I, High Sensitivity 15      Narrative:     Less than 99th percentile of normal range cutoff-  Female and children under 18 years old <14 ng/L; Male <21 ng/L: Negative  Repeat testing should be performed if  clinically indicated.     Female and children under 18 years old 14-50 ng/L; Male 21-50 ng/L:  Consistent with possible cardiac damage and possible increased clinical   risk. Serial measurements may help to assess extent of myocardial damage.     >50 ng/L: Consistent with cardiac damage, increased clinical risk and  myocardial infarction. Serial measurements may help assess extent of   myocardial damage.      NOTE: Children less than 1 year old may have higher baseline troponin   levels and results should be interpreted in conjunction with the overall   clinical context.     NOTE: Troponin I testing is performed using a different   testing methodology at [facility] than at other   [facility]. Direct result comparisons should only   be made within the same method.   SERIAL TROPONIN, 1 HOUR - Normal    Troponin I, High Sensitivity 20      Narrative:     Less than 99th percentile of normal range cutoff-  Female and children under 18 years old <14 ng/L; Male <21 ng/L: Negative  Repeat testing should be performed if clinically indicated.     Female and children under 18 years old 14-50 ng/L; Male 21-50 ng/L:  Consistent with possible cardiac damage and possible increased clinical   risk. Serial measurements may help to assess extent of myocardial damage.     >50 ng/L: Consistent with cardiac damage, increased clinical risk and  myocardial infarction. Serial measurements may help assess extent of   myocardial damage.      NOTE: Children less than 1 year old may have higher baseline troponin   levels and results should be interpreted in conjunction with the overall   clinical context.     NOTE: Troponin I testing is performed using a different   testing methodology at [facility] than at other   [facility]. Direct result comparisons should only   be made within the same method.   GREEN TOP    Extra Tube Hold for add-ons.     GRAY TOP    Extra Tube Hold for add-ons.     TROPONIN  SERIES- (INITIAL, 1 HR)    Narrative:     The following orders were created for panel order Troponin I Series, High Sensitivity (0, 1 HR).  Procedure                               Abnormality         Status                     ---------                               -----------         ------                     Troponin I, High Sensiti...[[id number]]  Normal              Final result               Troponin, High Sensitivi...[[id number]]  Normal              Final result                 Please view results for these tests on the individual orders.     CT angio chest for pulmonary embolism   Final Result   No acute pulmonary embolism through the lobar level beyond which   evaluation is nondiagnostic due to insufficient pulmonary artery   opacification No other acute cardiopulmonary process.   Hepatosplenomegaly, hepatic steatosis and cirrhosis.        MACRO:   None.        Signed by: Radu Chrsitina 3/25/2025 10:07 PM   Dictation workstation:   [id number]                 No data recorded     Suman Coma Scale Score: 15 (03/25/25 2016 : Felicity Dooley RN)                   EKG  2011 --twelve-lead EKG was obtained and read by me. This demonstrates normal sinus rhythm with a rate of 84, left anterior fascicular block, but no ectopy, no ischemia, no pericarditis. Compared to most recent prior EKG (6/9/24), normal sinus rhythm has replaced wide-complex tachycardia.  2111 --twelve-lead EKG was obtained and read by me. This demonstrates normal sinus rhythm with a rate of 72, left anterior fascicular block, but no ectopy, no ischemia, no pericarditis. There was no change compared to most recent prior EKG. arrival        Medical Decision Making  This patient presents emergency department the above history and physical.  No signs of sepsis, dehydration, ischemia, arrhythmia.  No hypoxemia or evidence of bronchospasm.  Patient is afebrile.    On laboratory evaluation patient has normal TSH, normal magnesium.  No significant  electrolyte abnormalities.  EKG and troponin negative x 2. AST 47 ALT 77.  No anemia.  D-dimer was elevated at 1343; therefore, CT angio chest ordered.  This was read by radiology no central pulmonary embolism, no pulmonary abnormality, gallstones, fatty liver/cirrhosis.    Patient remained asymptomatic throughout the emergency department stay.  Test results reviewed with patient.  He will resume his metoprolol.  Patient understands he may require further evaluation for the liver/gallstones by his primary care provider.    Patient presented to the emergency room with asymptomatic hypertension. There is no evidence of end organ dysfunction. There is no indication to acutely lower the blood pressure. Importance of follow-up was strongly stressed.    Patient presented to the emergency department with complaint of chest pain. Evaluation in the emergency department included consideration for STEMI,  acute coronary syndrome, pneumonia, pneumothorax, perforated ulcer, esophageal rupture, venous thromboembolism, acute abdomen. At this time, there is no evidence of acute life-threatening etiology for their symptoms.      Results of exam and any testing were discussed with patient/family. To the best of my ability, I answered all questions. At this time, there is no indication for admission/transfer or further diagnostic testing. Patient understands to return for any new or worsening symptoms, or failure to improve as anticipated. The importance of follow-up was stressed.    Procedure  Procedures     Aby Martinez MD  03/26/25 0059

## 2025-03-26 NOTE — DISCHARGE INSTRUCTIONS
Drink plenty of fluids.  Continue your medications as prescribed.    Return to the emergency department for fever, abdominal pain, uncontrolled vomiting, vomiting blood, blood in stool

## 2025-03-26 NOTE — ED TRIAGE NOTES
Pt to ED for rapid  heart rate that started at 7pm. Reports rate was in 150s sustained for about 45min and associated with upper back pain between shoulder blades. Denies pain now.

## 2025-03-28 ENCOUNTER — APPOINTMENT (OUTPATIENT)
Dept: PRIMARY CARE | Facility: CLINIC | Age: 60
End: 2025-03-28
Payer: MEDICAID

## 2025-04-01 ENCOUNTER — APPOINTMENT (OUTPATIENT)
Dept: PRIMARY CARE | Facility: CLINIC | Age: 60
End: 2025-04-01
Payer: MEDICAID

## 2025-04-01 VITALS
HEART RATE: 66 BPM | BODY MASS INDEX: 46.3 KG/M2 | SYSTOLIC BLOOD PRESSURE: 128 MMHG | WEIGHT: 315 LBS | TEMPERATURE: 97.6 F | OXYGEN SATURATION: 95 % | DIASTOLIC BLOOD PRESSURE: 70 MMHG

## 2025-04-01 DIAGNOSIS — E11.9 TYPE 2 DIABETES MELLITUS WITHOUT COMPLICATION, WITHOUT LONG-TERM CURRENT USE OF INSULIN: Primary | ICD-10-CM

## 2025-04-01 DIAGNOSIS — K76.0 NAFLD (NONALCOHOLIC FATTY LIVER DISEASE): ICD-10-CM

## 2025-04-01 DIAGNOSIS — H90.0 CONDUCTIVE HEARING LOSS, BILATERAL: ICD-10-CM

## 2025-04-01 LAB — POC HEMOGLOBIN A1C: 7.9 % (ref 4.2–6.5)

## 2025-04-01 PROCEDURE — 83036 HEMOGLOBIN GLYCOSYLATED A1C: CPT

## 2025-04-01 PROCEDURE — 99214 OFFICE O/P EST MOD 30 MIN: CPT

## 2025-04-01 PROCEDURE — 3078F DIAST BP <80 MM HG: CPT

## 2025-04-01 PROCEDURE — 3074F SYST BP LT 130 MM HG: CPT

## 2025-04-01 PROCEDURE — 4010F ACE/ARB THERAPY RXD/TAKEN: CPT

## 2025-04-01 RX ORDER — PIOGLITAZONEHYDROCHLORIDE 15 MG/1
15 TABLET ORAL DAILY
Qty: 90 TABLET | Refills: 0 | Status: SHIPPED | OUTPATIENT
Start: 2025-04-01 | End: 2025-06-30

## 2025-04-01 ASSESSMENT — PATIENT HEALTH QUESTIONNAIRE - PHQ9
SUM OF ALL RESPONSES TO PHQ9 QUESTIONS 1 AND 2: 0
1. LITTLE INTEREST OR PLEASURE IN DOING THINGS: NOT AT ALL
2. FEELING DOWN, DEPRESSED OR HOPELESS: NOT AT ALL

## 2025-04-01 NOTE — PROGRESS NOTES
Subjective   Patient ID: Cam Baird is a 59 y.o. male who presents for Follow-up (A1c check) and Diabetes (Last done 12/27/25 7.1/Today is 7.9).  Subjective  Cam Baird is an 59 y.o. male who presents for follow up of diabetes. Current symptoms include: hyperglycemia. Patient denies hypoglycemia . Evaluation to date has included: hemoglobin A1C. Home sugars: BGs consistently in an acceptable range. Current treatments: See medication reconciliation    Declines GLP-1 at this time  Will start actos and see in 3 months.    [unfilled]     Objective  [unfilled]     Laboratory:  Lab Results       Component                Value               Date                       HGBA1C                   7.9 (A)             04/01/2025              Assessment/Plan  Diabetes mellitus Type II, under poor control.  Started Actos; see medication orders.          Vitals:    04/01/25 1524   BP: 128/70   Pulse: 66   Temp: 36.4 °C (97.6 °F)   SpO2: 95%       Review of Systems    Objective   Physical Exam    Assessment/Plan   Problem List Items Addressed This Visit       NAFLD (nonalcoholic fatty liver disease)    Type 2 diabetes mellitus without complication, without long-term current use of insulin - Primary    Relevant Medications    pioglitazone (Actos) 15 mg tablet    Other Relevant Orders    POCT glycosylated hemoglobin (Hb A1C) manually resulted (Completed)     Other Visit Diagnoses       Conductive hearing loss, bilateral        Relevant Orders    Referral to Audiology                 Thank you for coming in today, please call my office if you have any concerns or questions.     Ramos BRADY, CNP

## 2025-04-01 NOTE — PATIENT INSTRUCTIONS
Continue glyburide, add actos 15 mg tablet in the morning.  See in 3 months A1c check    Thank you for coming in today, if any questions or concerns arise, please call my office.   CAITLYN Rico-CNP

## 2025-04-21 ENCOUNTER — TELEPHONE (OUTPATIENT)
Dept: PRIMARY CARE | Facility: CLINIC | Age: 60
End: 2025-04-21
Payer: MEDICAID

## 2025-04-21 DIAGNOSIS — E11.9 TYPE 2 DIABETES MELLITUS WITHOUT COMPLICATION, WITHOUT LONG-TERM CURRENT USE OF INSULIN: Primary | ICD-10-CM

## 2025-04-21 NOTE — TELEPHONE ENCOUNTER
"Patient called stating the actos has been causing swelling in his legs, feet, and wrists. He states he also was experiencing \"like bone pain\" and pain in under his ribs. He stopped taking his actos on Friday and states the swelling has gone down tremendously still has some pain but no where near like it was. He is asking for you to call him to discuss this when you have a moment.   "

## 2025-05-12 ENCOUNTER — HOSPITAL ENCOUNTER (OUTPATIENT)
Dept: RADIOLOGY | Facility: HOSPITAL | Age: 60
Discharge: HOME | End: 2025-05-12
Payer: MEDICAID

## 2025-05-12 DIAGNOSIS — N28.9 KIDNEY LESION, NATIVE, RIGHT: ICD-10-CM

## 2025-05-12 PROCEDURE — 74183 MRI ABD W/O CNTR FLWD CNTR: CPT | Performed by: STUDENT IN AN ORGANIZED HEALTH CARE EDUCATION/TRAINING PROGRAM

## 2025-05-12 PROCEDURE — 74183 MRI ABD W/O CNTR FLWD CNTR: CPT

## 2025-05-12 PROCEDURE — 2550000001 HC RX 255 CONTRASTS: Mod: JZ,SE | Performed by: UROLOGY

## 2025-05-12 PROCEDURE — A9575 INJ GADOTERATE MEGLUMI 0.1ML: HCPCS | Mod: JZ,SE | Performed by: UROLOGY

## 2025-05-12 RX ORDER — GADOTERATE MEGLUMINE 376.9 MG/ML
20 INJECTION INTRAVENOUS
Status: COMPLETED | OUTPATIENT
Start: 2025-05-12 | End: 2025-05-12

## 2025-05-12 RX ADMIN — GADOTERATE MEGLUMINE 20 ML: 376.9 INJECTION INTRAVENOUS at 13:23

## 2025-05-13 ENCOUNTER — APPOINTMENT (OUTPATIENT)
Dept: AUDIOLOGY | Facility: CLINIC | Age: 60
End: 2025-05-13
Payer: MEDICAID

## 2025-05-13 DIAGNOSIS — H90.3 ASYMMETRIC SNHL (SENSORINEURAL HEARING LOSS): ICD-10-CM

## 2025-05-13 DIAGNOSIS — H93.13 TINNITUS OF BOTH EARS: Primary | ICD-10-CM

## 2025-05-13 DIAGNOSIS — R94.120 NEGATIVE MIDDLE EAR PRESSURE OF BOTH EARS WITH TYPE C TYMPANOGRAM CURVE: ICD-10-CM

## 2025-05-13 DIAGNOSIS — H90.0 CONDUCTIVE HEARING LOSS, BILATERAL: ICD-10-CM

## 2025-05-13 PROCEDURE — 92557 COMPREHENSIVE HEARING TEST: CPT | Performed by: AUDIOLOGIST

## 2025-05-13 PROCEDURE — 92550 TYMPANOMETRY & REFLEX THRESH: CPT | Performed by: AUDIOLOGIST

## 2025-05-13 ASSESSMENT — PAIN - FUNCTIONAL ASSESSMENT: PAIN_FUNCTIONAL_ASSESSMENT: 0-10

## 2025-05-13 ASSESSMENT — PAIN SCALES - GENERAL: PAINLEVEL_OUTOF10: 0 - NO PAIN

## 2025-05-13 NOTE — LETTER
May 13, 2025     Ramos Quach, APRN-CNP  [address] [facility] [address]    Patient: Cam Baird   YOB: 1965   Date of Visit: 5/13/2025       Dear Dr. Ramos Quach, APRN-CNP:    Thank you for referring Cam Baird to me for evaluation. Below are my notes for this consultation.  If you have questions, please do not hesitate to call me. I look forward to following your patient along with you.       Sincerely,     Sarah J Lombardo, JASON, CCC-A      CC: No Recipients  ______________________________________________________________________________________    Cam Baird, age 59 years, is here today for a hearing evaluation.     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    Behavioral hearing evaluation revealed asymmetry with the left ear being worse:  Right ear - normal hearing sensitivity 125-2000 Hz sloping to moderate sensorineural hearing loss 2904-7458 Hz  Left ear - borderline normal hearing sensitivity to mild sensorineural hearing loss 125-1500 Hz sloping to moderate to moderately-severe 0885-1517 Hz    Speech reception thresholds obtained at a level consistent with pure tone thresholds bilaterally.    Word discrimination:  Right ear - excellent (100%)  Left ear - good (88%)    Tympanometry:  Right ear - Type A-C, slightly negative middle ear pressure with normal ear canal volume and normal eardrum mobility  Left ear - Type A-C, slightly negative middle ear pressure with normal ear canal volume and  normal eardrum mobility    Ipsilateral acoustic reflexes:  Probe right - present/elevated 500-4000 Hz  Probe left - present 500-4000 Hz    Contralateral acoustic reflexes:  Probe right - present/elevated 500-4000 Hz  Probe left - present/elevated 500-4000 Hz  The presence of acoustic reflexes within normal intensity limits is consistent with normal middle ear and brainstem function, and suggests that auditory sensitivity is not significantly impaired.     Distortion Product Otoacoustic Emissions (DPOAEs) absent 7211-5427 Hz bilaterally.  Present DPOAEs are consistent with normal cochlear outer hair cell function at those frequencies    Recommendations:  1) Follow up with Dr Salazar regarding asymmetric, sensorineural hearing loss and tinnitus  2) Re-evaluate hearing annually, to monitor, or sooner if a change in hearing is noted

## 2025-05-22 ENCOUNTER — APPOINTMENT (OUTPATIENT)
Dept: UROLOGY | Facility: HOSPITAL | Age: 60
End: 2025-05-22
Payer: MEDICAID

## 2025-05-27 ENCOUNTER — APPOINTMENT (OUTPATIENT)
Dept: OTOLARYNGOLOGY | Facility: CLINIC | Age: 60
End: 2025-05-27
Payer: MEDICAID

## 2025-05-27 DIAGNOSIS — H93.13 TINNITUS OF BOTH EARS: ICD-10-CM

## 2025-05-27 DIAGNOSIS — R09.81 NASAL CONGESTION: ICD-10-CM

## 2025-05-27 DIAGNOSIS — H90.3 SENSORINEURAL HEARING LOSS (SNHL) OF BOTH EARS: ICD-10-CM

## 2025-05-27 DIAGNOSIS — H90.3 ASYMMETRICAL SENSORINEURAL HEARING LOSS: Primary | ICD-10-CM

## 2025-05-27 PROCEDURE — 99204 OFFICE O/P NEW MOD 45 MIN: CPT | Performed by: OTOLARYNGOLOGY

## 2025-05-27 PROCEDURE — 1036F TOBACCO NON-USER: CPT | Performed by: OTOLARYNGOLOGY

## 2025-05-27 PROCEDURE — 4010F ACE/ARB THERAPY RXD/TAKEN: CPT | Performed by: OTOLARYNGOLOGY

## 2025-05-27 PROCEDURE — 3051F HG A1C>EQUAL 7.0%<8.0%: CPT | Performed by: OTOLARYNGOLOGY

## 2025-05-27 NOTE — PROGRESS NOTES
History Of Present Illness  Cam Baird is a 59 y.o. male, who presents for loss of hearing, vertigo, ear fullness.  He is kindly referred by STEVEN Quach CNP. 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 He has bilateral constant tinnitus in his ears for about a year. Left ear is worse.     For years, he has short lasting intermittent dizziness. Can happen while he is sitting and doing nothing. It may happen 1-2 times a week. He did not feel dizzy for the past one week. He has DM and hearth disease takes metoprolol, lisinopril, januvia and glyburide. He thinks dizziness is a side effect of his medications.     Off and on he feels fullness in his ears, left ear is worse.    Difficulty hearing - yes, both ears (left ear worse)  Excessive noise exposure - yes, occupational and shooting   Chronic ear infections - no  Ear pain - no  Ear drainage - no  Past ear surgery - no  Past hearing aid use - no  Family history - no    Secondly, patient has nasal congestion at nighttime.  At times he may need to sleep in a sitting or semisitting position.    Past Medical History  He has a past medical history of Dysmetabolic syndrome X, Hypertension, and Type 2 diabetes mellitus.    Surgical History  He has a past surgical history that includes Septoplasty.     Social History  He reports that he has never smoked. He has never used smokeless tobacco. He reports that he does not currently use alcohol. He reports that he does not use drugs.    Family History  Family History[1]     Allergies  Doxycycline hcl and Guaifenesin    Review of Systems        Physical Exam    General appearance: Healthy-appearing, well-nourished, well groomed, in no acute distress.     Head and Face: Atraumatic with no masses, lesions, or scarring.      Salivary glands: No tenderness of the parotid glands or parotid masses.     No tenderness of the submandibular glands or submandibular masses.       Facial strength: Normal strength and symmetry, no synkinesis or facial tic.     Eyes: Conjunctivas look non-hyperemic bilaterally    Ears: Bilaterally ear canals look normal. Tympanic membranes look intact, no hyperemia, fluid or retraction. Hearing grossly normal.      Nose: Mucosa looks normal. No purulent discharge. Septum essentially straight.     Oral Cavity/Mouth: Lips and tongue look normal.     Throat: No postnasal discharge. No tonsil hypertrophy. No hyperemia.    Neck: Symmetrical, trachea midline.     Pulmonary: Normal respiratory effort.     Lymphatic: No palpable pathologic lymph nodes at neck.     Neurological/Psychiatric Orientation to person, place, and time: Normal.     Mood and affect: Normal.      Extremities: No clubbing.     Skin: No significant skin lesions were noted at face or neck        Procedure       Last Recorded Vitals  There were no vitals taken for this visit.    Relevant Results  Prior to Admission medications    Medication Sig Start Date End Date Taking? Authorizing Provider   cholecalciferol (Vitamin D3) 50 mcg (2,000 unit) capsule Take 1 capsule (50 mcg) by mouth every other day.    Historical Provider, MD   glyBURIDE (Diabeta) 5 mg tablet Take 2 tablets (10 mg) by mouth 2 times a day. 12/9/24   Sabino Patiño MD   lisinopril 2.5 mg tablet Take 1 tablet (2.5 mg) by mouth once daily. 12/9/24 6/7/25  Sabino Patiño MD   metoprolol succinate XL (Toprol-XL) 50 mg 24 hr tablet Take 1 tablet (50 mg) by mouth once daily. 12/9/24 6/7/25  Sabino Patiño MD   SITagliptin phosphate (Januvia) 50 mg tablet Take 1 tablet (50 mg) by mouth once daily. 4/21/25 7/20/25  Ramos Quach, APRBIBI-CNP     05.13.2025: Behavioral hearing evaluation revealed asymmetry with the left ear being worse:  Right ear - normal hearing sensitivity 125-2000 Hz sloping to moderate sensorineural hearing loss 0225-3447 Hz  Left ear - borderline normal hearing sensitivity to mild sensorineural hearing loss  125-1500 Hz sloping to moderate to moderately-severe 5308-2255 Hz     Speech reception thresholds obtained at a level consistent with pure tone thresholds bilaterally.     Word discrimination:  Right ear - excellent (100%)  Left ear - good (88%)     Tympanometry:  Right ear - Type A-C, slightly negative middle ear pressure with normal ear canal volume and normal eardrum mobility  Left ear - Type A-C, slightly negative middle ear pressure with normal ear canal volume and normal eardrum mobility     Ipsilateral acoustic reflexes:  Probe right - present/elevated 500-4000 Hz  Probe left - present 500-4000 Hz     Contralateral acoustic reflexes:  Probe right - present/elevated 500-4000 Hz  Probe left - present/elevated 500-4000 Hz  The presence of acoustic reflexes within normal intensity limits is consistent with normal middle ear and brainstem function, and suggests that auditory sensitivity is not significantly impaired.      Distortion Product Otoacoustic Emissions (DPOAEs) absent 0492-8597 Hz bilaterally.  Present DPOAEs are consistent with normal cochlear outer hair cell function at those frequencies             Assessment and Plan:  Patient has bilateral sensorineural hearing loss. 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  His dizziness is off-and-on, relatively rare, and short lasting, could be side effect of his medications. Lenire was recommended for tinnitus.    As for the stuffiness in his nose at nighttime, I recommend radiofrequency turbinate reduction.    Darci Salazar MD  Otolaryngology - Head & Neck Surgery       [1]   Family History  Problem Relation Name Age of Onset    Diabetes type II Mother      Other (pulmondary hypertension) Mother      Diabetes type II Father      Heart attack Father

## 2025-06-11 NOTE — PROGRESS NOTES
"FUV    Last Visit :11/21/24     HISTORY OF PRESENT ILLNESS:   Cam Baird is a 59 y.o. male who is being seen for renal MRI results for incidentally found right renal lesion.  Renal CT on 6/9/24 demonstrated a 1.8 cm heterogeneous lesion at the right kidney  Renal MRI, 5/12/25: 18 mm mixed signal intensity nonenhancing right interpolar region, as described above, likely represents a Bosniak IIF cyst.    PAST MEDICAL HISTORY:  Past Medical History:   Diagnosis Date    Dysmetabolic syndrome X     Hypertension     Type 2 diabetes mellitus      PAST SURGICAL HISTORY:  Past Surgical History:   Procedure Laterality Date    SEPTOPLASTY          ALLERGIES:   Allergies   Allergen Reactions    Doxycycline Hcl Shortness of breath    Guaifenesin Shortness of breath        MEDICATIONS:   Current Outpatient Medications   Medication Instructions    cholecalciferol (VITAMIN D3) 2,000 Units, Every other day    glyBURIDE (DIABETA) 10 mg, oral, 2 times daily    lisinopril 2.5 mg, oral, Daily    metoprolol succinate XL (TOPROL-XL) 50 mg, oral, Daily    SITagliptin phosphate (JANUVIA) 50 mg, oral, Daily        PHYSICAL EXAM:  There were no vitals taken for this visit.  Constitutional: Patient appears well-developed and well-nourished. No distress.    Pulmonary/Chest: Effort normal. No respiratory distress.   Abdominal: Soft, ND NT  : WNL  Musculoskeletal: Normal range of motion.    Neurological: Alert and oriented to person, place, and time.  Psychiatric: Normal mood and affect. Behavior is normal. Thought content normal.      Labs:  No results found for: \"TESTOSTERONE\"  No results found for: \"PSA\"  No components found for: \"CBC\"  Lab Results   Component Value Date    CREATININE 0.86 03/25/2025     No components found for: \"TESTOTMS\"  No results found for: \"TESTF\"    Imaging:  - Renal CT on 6/9/24 demonstrated a 1.8 cm heterogeneous lesion at the right kidney.    Renal MRI, 5/12/25: 1.  18 mm mixed signal intensity nonenhancing " right interpolar  region, as described above, likely represents a Bosniak IIF cyst.  Recommend six-month-1 year MRI follow-up.  2.  Hepatomegaly and diffuse steatosis, with suggestion of underlying  chronic liver disease.    -Results reviewed with patient. Patient reassured.      Discussion:  Doing well, no complaints. Denies any dysuria, hematuria, bothersome urinary frequency, urgency, or flank pain. Patient denies any pushing or straining to urinate. Feels he fully emptying his bladder. Reviewed renal MRI results, pt reassured that it sio a Bosniak 2 cyst which we can safely monitor. Will get a psa now and call him with results. Will fu in 1 year with renal CT and PSA prior.   Assessment:      No diagnosis found.    Cam Baird is a 59 y.o. male here for FUV     Plan:   PSA now and call with results  Will follow up in 1 year with triple phase CT and PSA prior.   All questions and concerns were addressed. Patient verbalizes understanding and has no other questions at this time.      Scribe Attestation  By signing my name below, IIsha Scribe   attest that this documentation has been prepared under the direction and in the presence of Noam Johnston MD.

## 2025-06-12 ENCOUNTER — APPOINTMENT (OUTPATIENT)
Dept: UROLOGY | Facility: HOSPITAL | Age: 60
End: 2025-06-12
Payer: MEDICAID

## 2025-06-12 DIAGNOSIS — Z12.5 SCREENING FOR PROSTATE CANCER: ICD-10-CM

## 2025-06-12 DIAGNOSIS — N28.9 KIDNEY LESION, NATIVE, RIGHT: Primary | ICD-10-CM

## 2025-06-12 PROCEDURE — 3051F HG A1C>EQUAL 7.0%<8.0%: CPT | Performed by: UROLOGY

## 2025-06-12 PROCEDURE — 99214 OFFICE O/P EST MOD 30 MIN: CPT | Performed by: UROLOGY

## 2025-06-13 LAB — PSA SERPL-MCNC: 0.27 NG/ML

## 2025-07-03 ENCOUNTER — APPOINTMENT (OUTPATIENT)
Dept: PRIMARY CARE | Facility: CLINIC | Age: 60
End: 2025-07-03
Payer: MEDICAID

## 2025-07-03 VITALS
WEIGHT: 315 LBS | DIASTOLIC BLOOD PRESSURE: 80 MMHG | BODY MASS INDEX: 45.84 KG/M2 | OXYGEN SATURATION: 93 % | TEMPERATURE: 97 F | HEART RATE: 88 BPM | SYSTOLIC BLOOD PRESSURE: 152 MMHG

## 2025-07-03 DIAGNOSIS — E11.9 TYPE 2 DIABETES MELLITUS WITHOUT COMPLICATION, WITHOUT LONG-TERM CURRENT USE OF INSULIN: ICD-10-CM

## 2025-07-03 LAB — POC HEMOGLOBIN A1C: 8.7 % (ref 4.2–6.5)

## 2025-07-03 PROCEDURE — 3079F DIAST BP 80-89 MM HG: CPT

## 2025-07-03 PROCEDURE — 1036F TOBACCO NON-USER: CPT

## 2025-07-03 PROCEDURE — 3052F HG A1C>EQUAL 8.0%<EQUAL 9.0%: CPT

## 2025-07-03 PROCEDURE — 83036 HEMOGLOBIN GLYCOSYLATED A1C: CPT

## 2025-07-03 PROCEDURE — 3077F SYST BP >= 140 MM HG: CPT

## 2025-07-03 PROCEDURE — 99214 OFFICE O/P EST MOD 30 MIN: CPT

## 2025-07-03 RX ORDER — METFORMIN HYDROCHLORIDE 500 MG/1
500 TABLET ORAL
Qty: 180 TABLET | Refills: 0 | Status: SHIPPED | OUTPATIENT
Start: 2025-07-03 | End: 2025-10-01

## 2025-07-03 NOTE — PROGRESS NOTES
Subjective   Patient ID: Cam Baird is a 59 y.o. male who presents for Diabetes (Cam is here for A1c follow up//Last A1c was 4/1/2025 resulting 7.9/Today A1c is 8.7).  Subjective  Cam Baird is an 59 y.o. male who presents for follow up of diabetes. Current symptoms include: hyperglycemia. Patient denies hypoglycemia . Evaluation to date has included: hemoglobin A1C. Home sugars: BGs are running  consistent with Hgb A1C. Current treatments: januvia, however had side effects. Last dilated eye exam needed this year.    [unfilled]     Objective  [unfilled]     Laboratory:  Lab Results       Component                Value               Date                       HGBA1C                   8.7 (A)             07/03/2025              Assessment/Plan  Diabetes mellitus Type II, under poor control.  Restarted metformin; see  medication orders.          Vitals:    07/03/25 1545   BP: 152/80   Pulse: 88   Temp: 36.1 °C (97 °F)   SpO2: 93%       Review of Systems    Objective   Physical Exam    Assessment/Plan   Problem List Items Addressed This Visit       Type 2 diabetes mellitus without complication, without long-term current use of insulin    Relevant Medications    metFORMIN (Glucophage) 500 mg tablet    Other Relevant Orders    POCT glycosylated hemoglobin (Hb A1C) manually resulted (Completed)            Thank you for coming in today, please call my office if you have any concerns or questions.     Ramos BRADY, CNP

## 2025-07-03 NOTE — PATIENT INSTRUCTIONS
3 month follow up A1c recheck    Thank you for coming in today, if any questions or concerns arise, please call my office.   CAITLYN Rico-CNP

## 2025-10-03 ENCOUNTER — APPOINTMENT (OUTPATIENT)
Dept: PRIMARY CARE | Facility: CLINIC | Age: 60
End: 2025-10-03
Payer: MEDICAID

## (undated) DEVICE — SOLUTION, IRRIGATION, SODIUM CHLORIDE 0.9%, 1000 ML, POUR BOTTLE

## (undated) DEVICE — DRAPE PACK, GENERAL, CUSTOM, GENEVA

## (undated) DEVICE — GLOVE, SURGICAL, PROTEXIS PI , 7.0, PF, LF

## (undated) DEVICE — DRESSING, MEPILEX BORDER, POST-OP AG, 4 X 6 IN

## (undated) DEVICE — SOLUTION, IRRIGATION, STERILE WATER, 1000 ML, POUR BOTTLE

## (undated) DEVICE — 00000 VISIT COUNTER

## (undated) DEVICE — GLOVE, SURGICAL, PROTEXIS PI BLUE W/NEUTHERA, 7.0, PF, LF

## (undated) DEVICE — SUTURE, SILK, 2-0, TIES, 12-30 IN, BLACK

## (undated) DEVICE — DRESSING, TELFA, 3X4

## (undated) DEVICE — PAD, GROUNDING, ELECTROSURGICAL, W/9 FT CABLE, POLYHESIVE II, ADULT, LF